# Patient Record
Sex: FEMALE | Race: WHITE | NOT HISPANIC OR LATINO | Employment: FULL TIME | URBAN - METROPOLITAN AREA
[De-identification: names, ages, dates, MRNs, and addresses within clinical notes are randomized per-mention and may not be internally consistent; named-entity substitution may affect disease eponyms.]

---

## 2017-02-07 ENCOUNTER — ALLSCRIPTS OFFICE VISIT (OUTPATIENT)
Dept: OTHER | Facility: OTHER | Age: 52
End: 2017-02-07

## 2017-02-07 DIAGNOSIS — M85.80 OTHER SPECIFIED DISORDERS OF BONE DENSITY AND STRUCTURE, UNSPECIFIED SITE: ICD-10-CM

## 2017-02-07 DIAGNOSIS — D64.9 ANEMIA: ICD-10-CM

## 2017-02-07 DIAGNOSIS — F32.9 MAJOR DEPRESSIVE DISORDER, SINGLE EPISODE: ICD-10-CM

## 2017-02-07 DIAGNOSIS — E04.2 NONTOXIC MULTINODULAR GOITER: ICD-10-CM

## 2017-02-07 DIAGNOSIS — Z12.31 ENCOUNTER FOR SCREENING MAMMOGRAM FOR MALIGNANT NEOPLASM OF BREAST: ICD-10-CM

## 2017-02-07 DIAGNOSIS — R87.612 LOW GRADE SQUAMOUS INTRAEPITHELIAL LESION ON CYTOLOGIC SMEAR OF CERVIX (LGSIL): ICD-10-CM

## 2017-05-31 ENCOUNTER — GENERIC CONVERSION - ENCOUNTER (OUTPATIENT)
Dept: OTHER | Facility: OTHER | Age: 52
End: 2017-05-31

## 2017-06-02 ENCOUNTER — ALLSCRIPTS OFFICE VISIT (OUTPATIENT)
Dept: OTHER | Facility: OTHER | Age: 52
End: 2017-06-02

## 2017-06-05 LAB
ADDITIONAL INFORMATION (HISTORICAL): NORMAL
DIAGNOSIS (HISTORICAL): NORMAL
PATHOLOGIST (HISTORICAL): NORMAL
PLEASE NOTE (HISTORICAL): NORMAL
PROCEDURE (HISTORICAL): NORMAL
SITE/SOURCE (HISTORICAL): NORMAL

## 2017-07-10 ENCOUNTER — ALLSCRIPTS OFFICE VISIT (OUTPATIENT)
Dept: OTHER | Facility: OTHER | Age: 52
End: 2017-07-10

## 2017-07-14 ENCOUNTER — GENERIC CONVERSION - ENCOUNTER (OUTPATIENT)
Dept: OTHER | Facility: OTHER | Age: 52
End: 2017-07-14

## 2017-07-25 ENCOUNTER — ANESTHESIA EVENT (OUTPATIENT)
Dept: PERIOP | Facility: HOSPITAL | Age: 52
End: 2017-07-25
Payer: COMMERCIAL

## 2017-07-26 ENCOUNTER — ANESTHESIA (OUTPATIENT)
Dept: PERIOP | Facility: HOSPITAL | Age: 52
End: 2017-07-26
Payer: COMMERCIAL

## 2017-07-26 ENCOUNTER — HOSPITAL ENCOUNTER (OUTPATIENT)
Facility: HOSPITAL | Age: 52
Setting detail: OUTPATIENT SURGERY
Discharge: HOME | End: 2017-07-26
Attending: OBSTETRICS & GYNECOLOGY | Admitting: OBSTETRICS & GYNECOLOGY
Payer: COMMERCIAL

## 2017-07-26 VITALS
TEMPERATURE: 97.6 F | HEART RATE: 80 BPM | RESPIRATION RATE: 18 BRPM | SYSTOLIC BLOOD PRESSURE: 108 MMHG | HEIGHT: 64 IN | DIASTOLIC BLOOD PRESSURE: 61 MMHG | BODY MASS INDEX: 21.68 KG/M2 | OXYGEN SATURATION: 99 % | WEIGHT: 127 LBS

## 2017-07-26 DIAGNOSIS — N84.0 POLYP OF CORPUS UTERI: ICD-10-CM

## 2017-07-26 LAB
EXT PREGNANCY TEST URINE: NEGATIVE
HCT VFR BLD AUTO: 40.5 % (ref 34.8–46.1)

## 2017-07-26 PROCEDURE — 85014 HEMATOCRIT: CPT | Performed by: OBSTETRICS & GYNECOLOGY

## 2017-07-26 PROCEDURE — 81025 URINE PREGNANCY TEST: CPT | Performed by: OBSTETRICS & GYNECOLOGY

## 2017-07-26 PROCEDURE — 88305 TISSUE EXAM BY PATHOLOGIST: CPT | Performed by: OBSTETRICS & GYNECOLOGY

## 2017-07-26 RX ORDER — MAGNESIUM HYDROXIDE 1200 MG/15ML
LIQUID ORAL AS NEEDED
Status: DISCONTINUED | OUTPATIENT
Start: 2017-07-26 | End: 2017-07-26 | Stop reason: HOSPADM

## 2017-07-26 RX ORDER — SODIUM CHLORIDE 9 MG/ML
INJECTION, SOLUTION INTRAVENOUS AS NEEDED
Status: DISCONTINUED | OUTPATIENT
Start: 2017-07-26 | End: 2017-07-26 | Stop reason: HOSPADM

## 2017-07-26 RX ORDER — PROPOFOL 10 MG/ML
INJECTION, EMULSION INTRAVENOUS AS NEEDED
Status: DISCONTINUED | OUTPATIENT
Start: 2017-07-26 | End: 2017-07-26 | Stop reason: SURG

## 2017-07-26 RX ORDER — FENTANYL CITRATE 50 UG/ML
INJECTION, SOLUTION INTRAMUSCULAR; INTRAVENOUS AS NEEDED
Status: DISCONTINUED | OUTPATIENT
Start: 2017-07-26 | End: 2017-07-26 | Stop reason: SURG

## 2017-07-26 RX ORDER — ONDANSETRON 2 MG/ML
4 INJECTION INTRAMUSCULAR; INTRAVENOUS EVERY 6 HOURS PRN
Status: DISCONTINUED | OUTPATIENT
Start: 2017-07-26 | End: 2017-07-26 | Stop reason: HOSPADM

## 2017-07-26 RX ORDER — LIDOCAINE HYDROCHLORIDE 10 MG/ML
INJECTION, SOLUTION INFILTRATION; PERINEURAL AS NEEDED
Status: DISCONTINUED | OUTPATIENT
Start: 2017-07-26 | End: 2017-07-26 | Stop reason: SURG

## 2017-07-26 RX ORDER — FENTANYL CITRATE 50 UG/ML
50 INJECTION, SOLUTION INTRAMUSCULAR; INTRAVENOUS
Status: DISCONTINUED | OUTPATIENT
Start: 2017-07-26 | End: 2017-07-26 | Stop reason: HOSPADM

## 2017-07-26 RX ORDER — IBUPROFEN 600 MG/1
600 TABLET ORAL EVERY 6 HOURS PRN
Status: DISCONTINUED | OUTPATIENT
Start: 2017-07-26 | End: 2017-07-26 | Stop reason: HOSPADM

## 2017-07-26 RX ORDER — MIDAZOLAM HYDROCHLORIDE 1 MG/ML
INJECTION INTRAMUSCULAR; INTRAVENOUS AS NEEDED
Status: DISCONTINUED | OUTPATIENT
Start: 2017-07-26 | End: 2017-07-26 | Stop reason: SURG

## 2017-07-26 RX ORDER — SODIUM CHLORIDE 9 MG/ML
100 INJECTION, SOLUTION INTRAVENOUS CONTINUOUS
Status: DISCONTINUED | OUTPATIENT
Start: 2017-07-26 | End: 2017-07-26 | Stop reason: HOSPADM

## 2017-07-26 RX ORDER — ONDANSETRON 2 MG/ML
INJECTION INTRAMUSCULAR; INTRAVENOUS AS NEEDED
Status: DISCONTINUED | OUTPATIENT
Start: 2017-07-26 | End: 2017-07-26 | Stop reason: SURG

## 2017-07-26 RX ORDER — EPHEDRINE SULFATE 50 MG/ML
INJECTION, SOLUTION INTRAVENOUS AS NEEDED
Status: DISCONTINUED | OUTPATIENT
Start: 2017-07-26 | End: 2017-07-26 | Stop reason: SURG

## 2017-07-26 RX ADMIN — ONDANSETRON HYDROCHLORIDE 4 MG: 2 INJECTION, SOLUTION INTRAVENOUS at 12:21

## 2017-07-26 RX ADMIN — SODIUM CHLORIDE 100 ML/HR: 0.9 INJECTION, SOLUTION INTRAVENOUS at 11:52

## 2017-07-26 RX ADMIN — EPHEDRINE SULFATE 10 MG: 50 INJECTION, SOLUTION INTRAMUSCULAR; INTRAVENOUS; SUBCUTANEOUS at 12:12

## 2017-07-26 RX ADMIN — MIDAZOLAM HYDROCHLORIDE 2 MG: 1 INJECTION, SOLUTION INTRAMUSCULAR; INTRAVENOUS at 12:03

## 2017-07-26 RX ADMIN — LIDOCAINE HYDROCHLORIDE 50 MG: 10 INJECTION, SOLUTION INFILTRATION; PERINEURAL at 12:05

## 2017-07-26 RX ADMIN — DEXAMETHASONE SODIUM PHOSPHATE 6 MG: 10 INJECTION INTRAMUSCULAR; INTRAVENOUS at 12:19

## 2017-07-26 RX ADMIN — IBUPROFEN 600 MG: 600 TABLET, FILM COATED ORAL at 13:39

## 2017-07-26 RX ADMIN — PROPOFOL 200 MG: 10 INJECTION, EMULSION INTRAVENOUS at 12:05

## 2017-07-26 RX ADMIN — FENTANYL CITRATE 100 MCG: 50 INJECTION, SOLUTION INTRAMUSCULAR; INTRAVENOUS at 12:05

## 2017-08-21 ENCOUNTER — GENERIC CONVERSION - ENCOUNTER (OUTPATIENT)
Dept: OTHER | Facility: OTHER | Age: 52
End: 2017-08-21

## 2018-01-02 ENCOUNTER — GENERIC CONVERSION - ENCOUNTER (OUTPATIENT)
Dept: OTHER | Facility: OTHER | Age: 53
End: 2018-01-02

## 2018-01-10 NOTE — MISCELLANEOUS
Message   Date: 14 Jul 2017 1:47 PM EST, Recorded By: Asa Mike For: Heide Boehringer: Meagan Villagran, Self   Phone: [phone] (Home)   Reason: Renew Medication   Patient called c/o menses started again yesterday, 2 weeks early  Had been taking Aygestin 5 mg but doesn't have any left   has surgery scheduled for 7/26/17  Per [facility], called pharm with Aygestin 5 mg refill, once daily if needed, #20  Advised patient only take if flow increases  Active Problems    1  Anemia (285 9) (D64 9)   2  Depression (311) (F32 9)   3  Encounter for screening mammogram for breast cancer (V76 12) (Z12 31)   4  Endometrial polyp (621 0) (N84 0)   5  Menorrhagia (626 2) (N92 0)   6  Multiple thyroid nodules (241 1) (E04 2)   7  Osteopenia (733 90) (M85 80)   8  Pap smear abnormality of cervix with LGSIL (795 03) (R87 612)   9  Simple endometrial hyperplasia without atypia (621 31) (N85 01)    Current Meds   1  Collagen CAPS Recorded   2  Iron Supplement 325 (65 Fe) MG TABS; Therapy: 62ATO3880 to Recorded   3  Multivitamins Oral Capsule; Therapy: 02EXB0796 to Recorded   4  Norethindrone Acetate 5 MG Oral Tablet; 1 tablet BID; Therapy: 71NJH5868 to (Last Rx:02Jun2017)  Requested for: 02Jun2017 Ordered   5  Omega-3-acid Ethyl Esters 1 GM Oral Capsule Recorded   6  Progesterone CREA; Therapy: (Recorded:07Apr2016) to Recorded    Allergies    1   No Known Drug Allergies    Signatures   Electronically signed by : William Birch, ; Jul 14 2017  1:49PM EST                       (Author)

## 2018-01-12 VITALS
DIASTOLIC BLOOD PRESSURE: 68 MMHG | HEIGHT: 64 IN | SYSTOLIC BLOOD PRESSURE: 100 MMHG | WEIGHT: 134 LBS | HEART RATE: 80 BPM | BODY MASS INDEX: 22.88 KG/M2

## 2018-01-13 NOTE — MISCELLANEOUS
Message   Recorded as Task   Date: 02/25/2016 08:16 AM, Created By: Emily Lemon   Task Name: Go to Result   Assigned To: Theo Benedict   Regarding Patient: Dallas Stoo, Status: Active   Comment:    Emily Lemon - 25 Feb 2016 8:16 AM     TASK CREATED  LGSIL pap, + HPV, neg chlamydia and gc, needs colposcopy        Active Problems    1  Anemia (285 9) (D64 9)   2  Depression (311) (F32 9)   3  Encounter for screening for malignant neoplasm of cervix (V76 2) (Z12 4)   4  Encounter for screening for malignant neoplasm of colon (V76 51) (Z12 11)   5  Encounter for screening mammogram for malignant neoplasm of breast (V76 12)   (Z12 31)   6  Insomnia (780 52) (G47 00)   7  Multiple thyroid nodules (241 1) (E04 2)   8  Need for immunization against influenza (V04 81) (Z23)   9  Osteopenia (733 90) (M85 80)   10  Pap smear, as part of routine gynecological examination (V76 2) (Z01 419)   11  Screening examination for STD (sexually transmitted disease) (V74 5) (Z11 3)   12  Screening for HPV (human papillomavirus) (V73 81) (Z11 51)   13  Screening for STDs (sexually transmitted diseases) (V74 5) (Z11 3)   14  Well woman exam with routine gynecological exam (V72 31) (Z01 419)    Current Meds   1  Effexor XR 75 MG Oral Capsule Extended Release 24 Hour (Venlafaxine HCl ER); take 1   capsule by mouth once daily; Therapy: 08EVN7799 to Recorded   2  Iron Supplement 325 (65 Fe) MG Oral Tablet; Therapy: 51LWO0455 to Recorded   3  Multivitamins Oral Capsule; Therapy: 81QQS5428 to Recorded    Allergies    1   No Known Drug Allergies    Signatures   Electronically signed by : Nuris Naqvi, ; Feb 25 2016 10:19AM EST                       (Author)

## 2018-01-14 VITALS
SYSTOLIC BLOOD PRESSURE: 104 MMHG | DIASTOLIC BLOOD PRESSURE: 66 MMHG | HEIGHT: 64 IN | BODY MASS INDEX: 22.88 KG/M2 | WEIGHT: 134 LBS

## 2018-01-14 VITALS
HEIGHT: 64 IN | DIASTOLIC BLOOD PRESSURE: 68 MMHG | BODY MASS INDEX: 22.92 KG/M2 | SYSTOLIC BLOOD PRESSURE: 118 MMHG | WEIGHT: 134.25 LBS

## 2018-01-16 NOTE — MISCELLANEOUS
Message   Date: 08 Jun 2016 1:24 PM EST, Recorded By: Reid Pulido For: Chele Mcqueen   Caller: Javier Hsu, Self   Phone: [phone] (Home)   Reason: Medical Complaint   pt had some heavy bleeding    when the call was returned, it had slowed down    advised pt she can take 600 mg of Ibuprofen    pt will call if this continues           Active Problems    1  Abnormal cervical Pap smear with positive HPV DNA test (795 09,079 4)   2  Anemia (285 9) (D64 9)   3  Depression (311) (F32 9)   4  Encounter for screening for malignant neoplasm of cervix (V76 2) (Z12 4)   5  Encounter for screening for malignant neoplasm of colon (V76 51) (Z12 11)   6  Encounter for screening mammogram for malignant neoplasm of breast (V76 12)   (Z12 31)   7  Multiple thyroid nodules (241 1) (E04 2)   8  Need for immunization against influenza (V04 81) (Z23)   9  Osteopenia (733 90) (M85 80)   10  Pap smear abnormality of cervix with LGSIL (795 03) (R87 612)   11  Pap smear, as part of routine gynecological examination (V76 2) (Z01 419)   12  Screening examination for STD (sexually transmitted disease) (V74 5) (Z11 3)   13  Screening for HPV (human papillomavirus) (V73 81) (Z11 51)   14  Screening for STDs (sexually transmitted diseases) (V74 5) (Z11 3)   15  Well woman exam with routine gynecological exam (V72 31) (Z01 419)    Current Meds   1  Collagen CAPS Recorded   2  Effexor XR 75 MG Oral Capsule Extended Release 24 Hour (Venlafaxine HCl ER); take 1   capsule by mouth once daily; Therapy: 24PMN6947 to Recorded   3  Iron Supplement 325 (65 Fe) MG Oral Tablet; Therapy: 98VUD9997 to Recorded   4  Multivitamins Oral Capsule; Therapy: 51JXT0985 to Recorded   5  Omega-3-acid Ethyl Esters 1 GM Oral Capsule Recorded   6  Progesterone CREA; Therapy: (Recorded:13Xdt1504) to Recorded    Allergies    1   No Known Drug Allergies    Signatures   Electronically signed by : Patito Solis, ; Jun 8 2016  1:26PM EST (Author)

## 2018-01-16 NOTE — MISCELLANEOUS
Message   Recorded as Task   Date: 04/09/2016 10:27 AM, Created By: Wilkins Shone   Task Name: Go to Result   Assigned To: [facility]   Regarding Patient: Sarahi Holbrook, Status: In Progress   Comment:    Wilkins Shone - 09 Apr 2016 10:27 AM     TASK CREATED  colpo shows mild dysplasia, pap next year with HPV   Ruby Powell - 12 Apr 2016 8:17 AM     TASK IN PROGRESS    Patient informed  Active Problems    1  Abnormal cervical Pap smear with positive HPV DNA test (795 09,079 4)   2  Anemia (285 9) (D64 9)   3  Depression (311) (F32 9)   4  Encounter for screening for malignant neoplasm of cervix (V76 2) (Z12 4)   5  Encounter for screening for malignant neoplasm of colon (V76 51) (Z12 11)   6  Encounter for screening mammogram for malignant neoplasm of breast (V76 12)   (Z12 31)   7  Multiple thyroid nodules (241 1) (E04 2)   8  Need for immunization against influenza (V04 81) (Z23)   9  Osteopenia (733 90) (M85 80)   10  Pap smear abnormality of cervix with LGSIL (795 03) (R87 612)   11  Pap smear, as part of routine gynecological examination (V76 2) (Z01 419)   12  Screening examination for STD (sexually transmitted disease) (V74 5) (Z11 3)   13  Screening for HPV (human papillomavirus) (V73 81) (Z11 51)   14  Screening for STDs (sexually transmitted diseases) (V74 5) (Z11 3)   15  Well woman exam with routine gynecological exam (V72 31) (Z01 419)    Current Meds   1  Collagen CAPS Recorded   2  Effexor XR 75 MG Oral Capsule Extended Release 24 Hour (Venlafaxine HCl ER); take 1   capsule by mouth once daily; Therapy: 07EHS3965 to Recorded   3  Iron Supplement 325 (65 Fe) MG Oral Tablet; Therapy: 73LDE7094 to Recorded   4  Multivitamins Oral Capsule; Therapy: 26YUC3887 to Recorded   5  Omega-3-acid Ethyl Esters 1 GM Oral Capsule Recorded   6  Progesterone CREA; Therapy: (Recorded:57Btg6782) to Recorded    Allergies    1   No Known Drug Allergies    Signatures   Electronically signed by : Neil Conroy, ; Apr 12 2016  8:23AM EST                       (Author)

## 2018-01-17 NOTE — RESULT NOTES
Message   Recorded as Task   Date: 02/11/2016 07:09 PM, Created By: Faina Kaur   Task Name: Follow Up   Assigned To: Sylvia Davis   Regarding Patient: Mer Duval, Status: Active   Comment:    Raven Alfaro - 11 Feb 2016 7:09 PM     TASK CREATED  Please nofity patient that her thyroid U/S is stable  No further intervention is necessary at this time  Continue routine (annual) surveillance  Thank you,    Emily Lane - 15 Feb 2016 12:52 PM     TASK EDITED  [facility] informing pt of results and instructions - pt has follow up appt scheduled next month       Signatures   Electronically signed by : Radha Contreras, ; Feb 15 2016 12:52PM EST                       (Author)

## 2018-01-17 NOTE — RESULT NOTES
Verified Results  * MAMMO SCREENING BILATERAL W CAD [id number] 02:58PM Eugene Pay     Test Name Result Flag Reference   MAMMO SCREENING BILATERAL W CAD (Report)     Patient History:   Family history of unknown cancer in paternal grandmother at age    48 or over, unknown cancer in mother at age 48 or over, unknown    cancer in maternal aunt at age 48 or over, and unknown cancer in    maternal grandmother at age 48 or over  Patient is a former smoker  Patient's BMI is 23 0  Prior films   arrived on 3/3/16     Reason for exam: screening (asymptomatic)  Mammo Screening Bilateral W CAD: February 29, 2016 - Check In #:    [de-identified]   Bilateral CC and MLO view(s) were taken  Technologist: ANDREW Zamarripa (R)(M)   Prior study comparison: February 25, 2009, mammo screening    bilateral W CAD, performed at [facility]  November 6, 2007, mammo screening bilateral W CAD, performed at [facility]  The breast tissue is heterogeneously dense, potentially limiting    the sensitivity of mammography  Patient risk, included in this    report, assists in determining the appropriate screening regimen    (such as 3-D mammography or the inclusion of automated breast    ultrasound or MRI)  3-D mammography may also remain indicated as    screening  The parenchymal pattern appears stable  No dominant soft tissue    mass or suspicious calcifications are noted  The skin and nipple   contours are within normal limits  No mammographic evidence of malignancy  No    significant changes when compared with prior studies  ASSESSMENT: BiRad:1 - Negative     Recommendation:   Routine screening mammogram in 1 year  A reminder letter will be   scheduled  Analyzed by CAD     8-10% of cancers will be missed on mammography  Management of a    palpable abnormality must be based on clinical grounds  Patients   will be notified of their results via letter from our facility     Accredited by [employer] and FDA  Transcription Location: ANDREW [address] 98: [id number]     Risk Value(s):   Tyrer-Cuzick 10 Year: 2 440%, Tyrer-Cuzick Lifetime: 9 535%,    Myriad Table: 1 5%, GERARDO 5 Year: 1 2%, NCI Lifetime: 9 6%   Signed by:   Margarette Watts MD   3/10/16       Discussion/Summary   PLease notify of normal mammo  Repeat in 1 year     CB

## 2018-01-17 NOTE — RESULT NOTES
Message   Recorded as Task   Date: 03/04/2016 04:07 PM, Created By: Addie Porter   Task Name: Follow Up   Assigned To: Sylvia Davis   Regarding Patient: Neela Peraza, Status: Active   CommentRomell Jaime - 04 Mar 2016 4:07 PM     TASK CREATED  Please call patient  Her labs are great  She may have a copy if she would like  Her Vitamin D is slightly low, so I would double the dosage she is currently taking  Thyroid is normal     Thank you,    CB   Sylvia Davis - 07 Mar 2016 1:45 PM     TASK EDITED  pt notified of results and instructions    copy of labs mailed to pt     Signatures   Electronically signed by : Josie Echevarria, ; Mar  7 2016  1:45PM EST                       (Author)

## 2018-01-17 NOTE — PROCEDURES
Assessment    1  Abnormal cervical Pap smear with positive HPV DNA test (795 09,079 4)   2  Pap smear abnormality of cervix with LGSIL (795 03) (R87 612)    Plan  Abnormal cervical Pap smear with positive HPV DNA test, Pap smear abnormality of  cervix with LGSIL    · (Q) TISSUE PATHOLOGY; Status:Complete;   Done: 28UYB7634   Perform:[facility]; Order Comments:SPECIMEN: ECC SOURCE: ENDOCERVIX SPECIMEN: CERVICAL BIOPSY SOURCE: CERVIX @ 9:00; UKL:[id number]; Last Updated By:Elle Contreras; 4/7/2016 12:16:22 PM;Ordered; For:Abnormal cervical Pap smear with positive HPV DNA test, Pap smear abnormality of cervix with LGSIL; Ordered By:Laila Zarate;    Discussion/Summary  Discussion Summary:   LGSIL, + HPV - reviewed this with pt, will await colposcopy results    Her US today was normal with a small fibroid which is what I most likely palpated on exam       Active Problems    1  Anemia (285 9) (D64 9)   2  Depression (311) (F32 9)   3  Encounter for screening for malignant neoplasm of cervix (V76 2) (Z12 4)   4  Encounter for screening for malignant neoplasm of colon (V76 51) (Z12 11)   5  Encounter for screening mammogram for malignant neoplasm of breast (V76 12)   (Z12 31)   6  Multiple thyroid nodules (241 1) (E04 2)   7  Need for immunization against influenza (V04 81) (Z23)   8  Osteopenia (733 90) (M85 80)   9  Pap smear, as part of routine gynecological examination (V76 2) (Z01 419)   10  Screening examination for STD (sexually transmitted disease) (V74 5) (Z11 3)   11  Screening for HPV (human papillomavirus) (V73 81) (Z11 51)   12  Screening for STDs (sexually transmitted diseases) (V74 5) (Z11 3)   13  Well woman exam with routine gynecological exam (V72 31) (Z01 419)    Current Meds    1  Effexor XR 75 MG Oral Capsule Extended Release 24 Hour; take 1 capsule by mouth   once daily; Therapy: 04KQY1713 to Recorded    2  Collagen CAPS Recorded   3  Omega-3-acid Ethyl Esters 1 GM Oral Capsule Recorded    4   Iron Supplement 325 (65 Fe) MG Oral Tablet; Therapy: 56ZVG8245 to Recorded   5  Multivitamins Oral Capsule; Therapy: 25VXZ8230 to Recorded   6  Progesterone CREA; Therapy: (Recorded:07Apr2016) to Recorded    Allergies    1  No Known Drug Allergies    Procedure    Procedure: colposcopy  Indication: low grade squamous intraepithelial lesion  Were discussed with the patient  Procedure Note:   A cervical Pap smear was not performed  The squamocolumnar junction was fully visualized  Observation without staining showed no abnormalities  After bathing the cervix in acetic acid, evaluation showed acetowhite changes at 9 o'clock and nabothian at 12  Vaginal Vault: no abnormalities seen  Vulva: no abnormalities seen  Cervical Biopsy: 1 biopsies taken of the cervix  the biopsies were taken at 9 o'clock  Endocervical curettage was performed  Hemostasis was obtained with Monsel's solution  Patient Status: the patient tolerated the procedure well  Complications: there were no complications  Future Appointments    Date/Time Provider Specialty Site   06/08/2016 10:00 AM CHARLIE Aiken  [address] [facility]     Signatures   Electronically signed by : Vladislav Hidalgo DO;  Apr 7 2016 12:55PM EST                       (Author)

## 2018-01-19 ENCOUNTER — GENERIC CONVERSION - ENCOUNTER (OUTPATIENT)
Dept: OTHER | Facility: OTHER | Age: 53
End: 2018-01-19

## 2018-01-22 VITALS
WEIGHT: 129 LBS | DIASTOLIC BLOOD PRESSURE: 72 MMHG | BODY MASS INDEX: 22.02 KG/M2 | HEIGHT: 64 IN | SYSTOLIC BLOOD PRESSURE: 102 MMHG

## 2018-01-23 NOTE — MISCELLANEOUS
Message   Recorded as Task   Date: 01/19/2018 11:35 AM, Created By: Clarence Montilla   Task Name: Medical Complaint Callback   Assigned To: Ruby Powell   Regarding Patient: Kenia Franklin, Status: Active   CommentLoa Shows - 19 Jan 2018 11:35 AM     TASK CREATED  Patient called c/o bleeding again , heavy flow, finished Aygestin  She is coming in for appointment in February to schedule surgery  Questions if she needs refill of Aygestin to last until then  Bull Collazo - 19 Jan 2018 11:45 AM     TASK REPLIED TO: Previously Assigned To Bull Collazo  I wouldTeller to stayOn Aygestin at thisTime till I see her forSurgery scheduling  Called pharm with Rx refill  Active Problems    1  Anemia (285 9) (D64 9)   2  Depression (311) (F32 9)   3  Encounter for screening mammogram for breast cancer (V76 12) (Z12 31)   4  Endometrial polyp (621 0) (N84 0)   5  Menorrhagia (626 2) (N92 0)   6  Multiple thyroid nodules (241 1) (E04 2)   7  Osteopenia (733 90) (M85 80)   8  Pap smear abnormality of cervix with LGSIL (795 03) (R87 612)   9  Simple endometrial hyperplasia without atypia (621 31) (N85 01)    Current Meds   1  Iron Supplement 325 (65 Fe) MG TABS; Therapy: 29AHY2362 to Recorded   2  Multivitamins Oral Capsule; Therapy: 91AZI5248 to Recorded   3  Norethindrone Acetate 5 MG Oral Tablet; TAKE AS DIRECTED; Therapy: 72CPH0223 to (PJRWNRWX:20KCY0243)  Requested for: 12Jan2018; Last   Rx:12Jan2018 Ordered    Allergies    1   No Known Drug Allergies    Plan  Menorrhagia    · Norethindrone Acetate 5 MG Oral Tablet (Aygestin); TAKE AS DIRECTED    Signatures   Electronically signed by : Yunior Cabrera, ; Jan 19 2018  1:35PM EST                       (Author)

## 2018-01-23 NOTE — MISCELLANEOUS
Message   Recorded as Task   Date: 01/02/2018 11:20 AM, Created By: Serina Jain   Task Name: Medical Complaint Callback   Assigned To: Rbuy Powell   Regarding Patient: Hari Pereyra, Status: Active   CommentTashley Barrera - 02 Jan 2018 11:20 AM     TASK CREATED  Patient called c/o heavy menses, day 2, 7 tampons so far today, bright red bleeding  Questions if needs Rx for Aygestin as used before  Had D&C on 7/26/17  Skipped menses until November  Now heavy menses  Bull Collazo - 02 Jan 2018 11:28 AM     TASK REPLIED TO: Previously Assigned To Bull Collazo  Give her 5 tablets of Aygestin, 5 mg each, to start now and to take 1 daily till complete  Escript sent  Active Problems    1  Anemia (285 9) (D64 9)   2  Depression (311) (F32 9)   3  Encounter for screening mammogram for breast cancer (V76 12) (Z12 31)   4  Endometrial polyp (621 0) (N84 0)   5  Menorrhagia (626 2) (N92 0)   6  Multiple thyroid nodules (241 1) (E04 2)   7  Osteopenia (733 90) (M85 80)   8  Pap smear abnormality of cervix with LGSIL (795 03) (R87 612)   9  Simple endometrial hyperplasia without atypia (621 31) (N85 01)    Current Meds   1  Iron Supplement 325 (65 Fe) MG TABS; Therapy: 75JUG7882 to Recorded   2  Multivitamins Oral Capsule; Therapy: 19BEU6056 to Recorded    Allergies    1   No Known Drug Allergies    Plan  Menorrhagia    · Norethindrone Acetate 5 MG Oral Tablet (Aygestin); TAKE AS DIRECTED    Signatures   Electronically signed by : Humberto Monteiro, ; Jan 2 2018 12:23PM EST                       (Author)

## 2018-02-22 ENCOUNTER — CONSULT (OUTPATIENT)
Dept: OBGYN CLINIC | Facility: CLINIC | Age: 53
End: 2018-02-22
Payer: COMMERCIAL

## 2018-02-22 VITALS
HEIGHT: 63 IN | SYSTOLIC BLOOD PRESSURE: 100 MMHG | BODY MASS INDEX: 23.42 KG/M2 | WEIGHT: 132.2 LBS | DIASTOLIC BLOOD PRESSURE: 60 MMHG

## 2018-02-22 DIAGNOSIS — N87.0 CIN I (CERVICAL INTRAEPITHELIAL NEOPLASIA I): Primary | ICD-10-CM

## 2018-02-22 DIAGNOSIS — N92.6 IRREGULAR MENSES: ICD-10-CM

## 2018-02-22 DIAGNOSIS — Z12.31 ENCOUNTER FOR SCREENING MAMMOGRAM FOR BREAST CANCER: ICD-10-CM

## 2018-02-22 PROCEDURE — 99213 OFFICE O/P EST LOW 20 MIN: CPT | Performed by: OBSTETRICS & GYNECOLOGY

## 2018-02-22 NOTE — PROGRESS NOTES
Assessment/Plan:       Simple hyperplasia without atypia, resolved-I reviewed this with her in detail including pictures  She had 1 prolonged, heavy cycle but she has also been skipping cycles  I would recommend observation at this time  She will keep menstrual calendar and call if she has any further prolonged or heavy cycles  She does have another refill of Aygestin at home and this could be used if she has another abnormal cycle, but she will call  JW 1- Pap done, if this is normal it will be repeated next year    Encouraged her to come in for her yearly  Diagnoses and all orders for this visit:    JW I (cervical intraepithelial neoplasia I)  -     Thinprep Pap    Encounter for screening mammogram for breast cancer    Other orders  -     norethindrone (AYGESTIN) 5 mg tablet; Take by mouth          Subjective:     Patient ID: Ivett Portillo is a 45 y o  female  Patient here to discuss her menstrual cycles  Last summer she was having irregular cycles  She came in and had SIS and an endometrial biopsy  She had simple hyperplasia without atypia and an endometrial polyp  She was treated with progesterone in then had a D&C  Pathology revealed normal endometrium with no hyperplasia and a benign endometrial polyp  She is been skipping menstrual cycles but in December she had a very heavy cycle that lasted about 3 weeks  She did not call  She started taking Aygestin that she had at home and then stopped it and is now having a withdrawal bleed this week  It is not heavy  OF note, she had an abnormal pap in 2016 and JW I  She has not had a repap yet  Review of Systems   All other systems reviewed and are negative  Objective:     Physical Exam   Genitourinary: Vagina normal and uterus normal  Right adnexum displays no mass, no tenderness and no fullness  Left adnexum displays no mass, no tenderness and no fullness

## 2018-02-27 LAB
QUESTION/PROBLEM: NORMAL
SL AMB CONTAINER TYPE: NORMAL
SL AMB FINAL RESOLUTION: NORMAL
SL AMB REPORT STATUS: NORMAL

## 2018-04-10 ENCOUNTER — TELEPHONE (OUTPATIENT)
Dept: OBGYN CLINIC | Facility: CLINIC | Age: 53
End: 2018-04-10

## 2018-04-10 NOTE — TELEPHONE ENCOUNTER
----- Message from Lorraine Barnard DO sent at 4/10/2018  1:11 PM EDT -----  Initially this pap was labeled incorrectly, we fixed this and the lab was supposed to run it, I still havent received a result  Can you check on it and if it was not done, please call pt and ask her to come in for a repeat, free of charge  Please let me know the outcome  Thanks

## 2019-02-06 ENCOUNTER — OFFICE VISIT (OUTPATIENT)
Dept: FAMILY MEDICINE CLINIC | Facility: CLINIC | Age: 54
End: 2019-02-06
Payer: COMMERCIAL

## 2019-02-06 VITALS
HEART RATE: 72 BPM | DIASTOLIC BLOOD PRESSURE: 58 MMHG | WEIGHT: 125.8 LBS | HEIGHT: 63 IN | BODY MASS INDEX: 22.29 KG/M2 | SYSTOLIC BLOOD PRESSURE: 112 MMHG

## 2019-02-06 DIAGNOSIS — Z12.39 BREAST CANCER SCREENING: ICD-10-CM

## 2019-02-06 DIAGNOSIS — D50.0 IRON DEFICIENCY ANEMIA DUE TO CHRONIC BLOOD LOSS: ICD-10-CM

## 2019-02-06 DIAGNOSIS — R07.89 ATYPICAL CHEST PAIN: ICD-10-CM

## 2019-02-06 DIAGNOSIS — E04.2 MULTIPLE THYROID NODULES: ICD-10-CM

## 2019-02-06 DIAGNOSIS — E55.9 VITAMIN D DEFICIENCY: ICD-10-CM

## 2019-02-06 DIAGNOSIS — M85.89 OSTEOPENIA OF MULTIPLE SITES: ICD-10-CM

## 2019-02-06 DIAGNOSIS — F41.9 ANXIETY: ICD-10-CM

## 2019-02-06 DIAGNOSIS — R92.2 DENSE BREAST: ICD-10-CM

## 2019-02-06 DIAGNOSIS — Z13.220 LIPID SCREENING: ICD-10-CM

## 2019-02-06 DIAGNOSIS — Z00.00 ENCOUNTER FOR PHYSICAL EXAMINATION: Primary | ICD-10-CM

## 2019-02-06 PROBLEM — R92.30 DENSE BREAST: Status: ACTIVE | Noted: 2019-02-06

## 2019-02-06 PROBLEM — N84.0 ENDOMETRIAL POLYP: Status: ACTIVE | Noted: 2017-07-10

## 2019-02-06 PROBLEM — N85.01 SIMPLE ENDOMETRIAL HYPERPLASIA WITHOUT ATYPIA: Status: ACTIVE | Noted: 2017-07-10

## 2019-02-06 PROCEDURE — 99396 PREV VISIT EST AGE 40-64: CPT | Performed by: PHYSICIAN ASSISTANT

## 2019-02-06 NOTE — ASSESSMENT & PLAN NOTE
Exam WNL  Through discussion it appears that her symptoms are all related to stress and anxiety  EKG deferred by pt today  Check labs  BP good

## 2019-02-06 NOTE — PATIENT INSTRUCTIONS
Problem List Items Addressed This Visit        Endocrine    Multiple thyroid nodules     Last thyroid U/S was 3 days ago  U/S ordered  Relevant Orders    US thyroid    TSH, 3rd generation with Free T4 reflex       Musculoskeletal and Integument    Osteopenia of multiple sites     DEXA ordered as last was 2016  Relevant Orders    DXA bone density spine hip and pelvis       Other    Iron deficiency anemia due to chronic blood loss     Check CBC and iron panel  Relevant Orders    CBC and differential    Dense breast    Breast cancer screening     3D mammo ordered  Relevant Orders    Mammo screening bilateral w 3d & cad    Anxiety     Pt declines need for medication at this time  Encounter for physical examination - Primary     Check fasting labs, mammo, DEXA  See GYN  All else up to date  Atypical chest pain     Exam WNL  Through discussion it appears that her symptoms are all related to stress and anxiety  EKG deferred by pt today  Check labs  BP good            Relevant Orders    TSH, 3rd generation with Free T4 reflex    Comprehensive metabolic panel    CBC and differential      Other Visit Diagnoses     Lipid screening        Relevant Orders    Lipid panel    Vitamin D deficiency        Relevant Orders    Vitamin D 25 hydroxy    Comprehensive metabolic panel

## 2019-02-06 NOTE — PROGRESS NOTES
Assessment/Plan:    Osteopenia of multiple sites  DEXA ordered as last was 2016  Iron deficiency anemia due to chronic blood loss  Check CBC and iron panel  Breast cancer screening  3D mammo ordered  Multiple thyroid nodules  Last thyroid U/S was 3 days ago  U/S ordered  Anxiety  Pt declines need for medication at this time  Atypical chest pain  Exam WNL  Through discussion it appears that her symptoms are all related to stress and anxiety  EKG deferred by pt today  Check labs  BP good  Encounter for physical examination  Check fasting labs, mammo, DEXA  See GYN  All else up to date  Diagnoses and all orders for this visit:    Encounter for physical examination    Osteopenia of multiple sites  -     DXA bone density spine hip and pelvis; Future    Dense breast    Breast cancer screening  -     Mammo screening bilateral w 3d & cad; Future    Multiple thyroid nodules  -     US thyroid; Future  -     TSH, 3rd generation with Free T4 reflex    Anxiety    Iron deficiency anemia due to chronic blood loss  -     CBC and differential    Atypical chest pain  -     TSH, 3rd generation with Free T4 reflex  -     Comprehensive metabolic panel  -     CBC and differential    Lipid screening  -     Lipid panel    Vitamin D deficiency  -     Vitamin D 25 hydroxy  -     Comprehensive metabolic panel          Subjective:   CC: Follow up for routine check  C/o pain that radiates down right arm  Patient ID: Stephanie Camacho is a 52 y o  female  Patient here for a "routine physical" however she does have a complaint of right arm pain associated with initial center chest pain that only is so stated with anxiety and stress per patient and not physical exertion  Pt not taking daily iron only sometimes but just ran out  CP in her chest lower right side over the holidays which radiates up and over R arm when she feels stressed  Hand gets cold  R handed   Can go to the gym and do a work out and not have chest pain it only comes when she is tense and muscles are stressed  Can happed in the middle of the night when she is thinking and cant sleep when brain can not stop going  Very concerned about the cost of [facility] as she has to pay $6,000 a year just to have health care and then her deductible is high  Ex  and money stressors are high  She is overdue for a mammo and Gyn visit  Did a dental visit this year  Eye visit done within 6 months  Patient is worried that this visit will not be coded and paid for properly if I do not market as a physical however patient has multiple ongoing chronic issues that need to be looked into  She is declining EKG today  Patient states that I can order anything that is appropriate but that she likely will not go ahead and complete any of her imaging or blood work due to cost   She states she is mainly here today to discuss her chest pain and right arm pain as above  The following portions of the patient's history were reviewed and updated as appropriate: allergies, current medications, past family history, past medical history, past social history, past surgical history and problem list     Review of Systems   Constitutional: Negative  HENT: Negative  Eyes: Negative  Respiratory: Negative  Cardiovascular: Negative  Gastrointestinal: Negative  Endocrine: Negative  Genitourinary: Negative  Musculoskeletal: Negative  R arm pain   Skin: Negative  Allergic/Immunologic: Negative  Neurological: Negative  Hematological: Negative  Psychiatric/Behavioral: Negative  Objective:      Vitals:    02/06/19 0928   BP: 112/58   BP Location: Left arm   Patient Position: Sitting   Pulse: 72   Weight: 57 1 kg (125 lb 12 8 oz)   Height: 5' 3" (1 6 m)          Physical Exam   Constitutional: She is oriented to person, place, and time  She appears well-developed and well-nourished  No distress     HENT:   Head: Normocephalic and atraumatic  Right Ear: Hearing, tympanic membrane, external ear and ear canal normal    Left Ear: Hearing, tympanic membrane, external ear and ear canal normal    Nose: Nose normal    Mouth/Throat: Uvula is midline, oropharynx is clear and moist and mucous membranes are normal  No oropharyngeal exudate  Eyes: Pupils are equal, round, and reactive to light  Conjunctivae, EOM and lids are normal  No scleral icterus  Neck: Normal range of motion  Carotid bruit is not present  No thyroid mass and no thyromegaly present  Cardiovascular: Regular rhythm, normal heart sounds and normal pulses  Exam reveals no gallop and no friction rub  No murmur heard  Pulmonary/Chest: Effort normal and breath sounds normal  No respiratory distress  She has no wheezes  She has no rhonchi  She has no rales  Abdominal: Soft  Normal appearance and bowel sounds are normal  She exhibits no distension, no abdominal bruit and no mass  There is no hepatosplenomegaly  There is no tenderness  There is no rebound and no guarding  No hernia  Musculoskeletal: Normal range of motion  Lymphadenopathy:     She has no cervical adenopathy  Neurological: She is alert and oriented to person, place, and time  She has normal strength and normal reflexes  She is not disoriented  No sensory deficit  She exhibits normal muscle tone  Coordination and gait normal    Skin: Skin is warm, dry and intact  She is not diaphoretic  Psychiatric: She has a normal mood and affect  Her speech is normal and behavior is normal  Judgment and thought content normal  Cognition and memory are normal    Nursing note and vitals reviewed

## 2020-01-24 ENCOUNTER — APPOINTMENT (OUTPATIENT)
Dept: LAB | Facility: CLINIC | Age: 55
End: 2020-01-24
Payer: COMMERCIAL

## 2020-01-24 LAB
ALBUMIN SERPL BCP-MCNC: 3.8 G/DL (ref 3.5–5)
ALP SERPL-CCNC: 45 U/L (ref 46–116)
ALT SERPL W P-5'-P-CCNC: 19 U/L (ref 12–78)
ANION GAP SERPL CALCULATED.3IONS-SCNC: 3 MMOL/L (ref 4–13)
AST SERPL W P-5'-P-CCNC: 10 U/L (ref 5–45)
BASOPHILS # BLD AUTO: 0.02 THOUSANDS/ΜL (ref 0–0.1)
BASOPHILS NFR BLD AUTO: 0 % (ref 0–1)
BILIRUB SERPL-MCNC: 0.88 MG/DL (ref 0.2–1)
BUN SERPL-MCNC: 14 MG/DL (ref 5–25)
CALCIUM SERPL-MCNC: 9.2 MG/DL (ref 8.3–10.1)
CHLORIDE SERPL-SCNC: 107 MMOL/L (ref 100–108)
CHOLEST SERPL-MCNC: 198 MG/DL (ref 50–200)
CO2 SERPL-SCNC: 29 MMOL/L (ref 21–32)
CREAT SERPL-MCNC: 0.76 MG/DL (ref 0.6–1.3)
EOSINOPHIL # BLD AUTO: 0.23 THOUSAND/ΜL (ref 0–0.61)
EOSINOPHIL NFR BLD AUTO: 5 % (ref 0–6)
ERYTHROCYTE [DISTWIDTH] IN BLOOD BY AUTOMATED COUNT: 13.4 % (ref 11.6–15.1)
GFR SERPL CREATININE-BSD FRML MDRD: 88 ML/MIN/1.73SQ M
GLUCOSE P FAST SERPL-MCNC: 75 MG/DL (ref 65–99)
HCT VFR BLD AUTO: 41.1 % (ref 34.8–46.1)
HDLC SERPL-MCNC: 92 MG/DL
HGB BLD-MCNC: 12.7 G/DL (ref 11.5–15.4)
IMM GRANULOCYTES # BLD AUTO: 0.01 THOUSAND/UL (ref 0–0.2)
IMM GRANULOCYTES NFR BLD AUTO: 0 % (ref 0–2)
LDLC SERPL CALC-MCNC: 100 MG/DL (ref 0–100)
LYMPHOCYTES # BLD AUTO: 1.7 THOUSANDS/ΜL (ref 0.6–4.47)
LYMPHOCYTES NFR BLD AUTO: 37 % (ref 14–44)
MCH RBC QN AUTO: 30.1 PG (ref 26.8–34.3)
MCHC RBC AUTO-ENTMCNC: 30.9 G/DL (ref 31.4–37.4)
MCV RBC AUTO: 97 FL (ref 82–98)
MONOCYTES # BLD AUTO: 0.51 THOUSAND/ΜL (ref 0.17–1.22)
MONOCYTES NFR BLD AUTO: 11 % (ref 4–12)
NEUTROPHILS # BLD AUTO: 2.17 THOUSANDS/ΜL (ref 1.85–7.62)
NEUTS SEG NFR BLD AUTO: 47 % (ref 43–75)
NONHDLC SERPL-MCNC: 106 MG/DL
NRBC BLD AUTO-RTO: 0 /100 WBCS
PLATELET # BLD AUTO: 252 THOUSANDS/UL (ref 149–390)
PMV BLD AUTO: 10.7 FL (ref 8.9–12.7)
POTASSIUM SERPL-SCNC: 4.4 MMOL/L (ref 3.5–5.3)
PROT SERPL-MCNC: 7.7 G/DL (ref 6.4–8.2)
RBC # BLD AUTO: 4.22 MILLION/UL (ref 3.81–5.12)
SODIUM SERPL-SCNC: 139 MMOL/L (ref 136–145)
TRIGL SERPL-MCNC: 32 MG/DL
TSH SERPL DL<=0.05 MIU/L-ACNC: 2.3 UIU/ML (ref 0.36–3.74)
WBC # BLD AUTO: 4.64 THOUSAND/UL (ref 4.31–10.16)

## 2020-01-24 PROCEDURE — 36415 COLL VENOUS BLD VENIPUNCTURE: CPT | Performed by: PHYSICIAN ASSISTANT

## 2020-01-24 PROCEDURE — 80061 LIPID PANEL: CPT | Performed by: PHYSICIAN ASSISTANT

## 2020-01-24 PROCEDURE — 85025 COMPLETE CBC W/AUTO DIFF WBC: CPT | Performed by: PHYSICIAN ASSISTANT

## 2020-01-24 PROCEDURE — 80053 COMPREHEN METABOLIC PANEL: CPT | Performed by: PHYSICIAN ASSISTANT

## 2020-01-24 PROCEDURE — 84443 ASSAY THYROID STIM HORMONE: CPT | Performed by: PHYSICIAN ASSISTANT

## 2020-01-29 ENCOUNTER — OFFICE VISIT (OUTPATIENT)
Dept: FAMILY MEDICINE CLINIC | Facility: CLINIC | Age: 55
End: 2020-01-29
Payer: COMMERCIAL

## 2020-01-29 VITALS
HEIGHT: 63 IN | BODY MASS INDEX: 22.68 KG/M2 | HEART RATE: 96 BPM | SYSTOLIC BLOOD PRESSURE: 98 MMHG | WEIGHT: 128 LBS | TEMPERATURE: 99.1 F | DIASTOLIC BLOOD PRESSURE: 62 MMHG | RESPIRATION RATE: 18 BRPM

## 2020-01-29 DIAGNOSIS — Z00.00 HEALTHCARE MAINTENANCE: ICD-10-CM

## 2020-01-29 DIAGNOSIS — D50.0 IRON DEFICIENCY ANEMIA DUE TO CHRONIC BLOOD LOSS: ICD-10-CM

## 2020-01-29 DIAGNOSIS — R07.9 CHEST PAIN, UNSPECIFIED TYPE: ICD-10-CM

## 2020-01-29 DIAGNOSIS — Z11.59 NEED FOR HEPATITIS C SCREENING TEST: ICD-10-CM

## 2020-01-29 DIAGNOSIS — E04.2 MULTIPLE THYROID NODULES: ICD-10-CM

## 2020-01-29 DIAGNOSIS — Z12.39 BREAST CANCER SCREENING: ICD-10-CM

## 2020-01-29 DIAGNOSIS — M85.89 OSTEOPENIA OF MULTIPLE SITES: ICD-10-CM

## 2020-01-29 DIAGNOSIS — R07.89 CHEST TIGHTNESS: Primary | ICD-10-CM

## 2020-01-29 DIAGNOSIS — F41.9 ANXIETY: ICD-10-CM

## 2020-01-29 DIAGNOSIS — Z78.0 MENOPAUSE: ICD-10-CM

## 2020-01-29 PROCEDURE — 3008F BODY MASS INDEX DOCD: CPT | Performed by: PHYSICIAN ASSISTANT

## 2020-01-29 PROCEDURE — 99213 OFFICE O/P EST LOW 20 MIN: CPT | Performed by: PHYSICIAN ASSISTANT

## 2020-01-29 PROCEDURE — 93000 ELECTROCARDIOGRAM COMPLETE: CPT | Performed by: PHYSICIAN ASSISTANT

## 2020-01-29 PROCEDURE — 1036F TOBACCO NON-USER: CPT | Performed by: PHYSICIAN ASSISTANT

## 2020-01-29 NOTE — ASSESSMENT & PLAN NOTE
CBC CMP lipid panel and TSH just done in all within normal limits  EKG done today and normal   Likely anxiety and patient is where this now that she knows all of her blood work in EKG was normal she feels a lot better  She is able to exercise at the gym without any chest pain or shortness of breath

## 2020-01-29 NOTE — PROGRESS NOTES
Assessment and Plan:    Problem List Items Addressed This Visit        Endocrine    Multiple thyroid nodules     Over due for US  Reordered  Relevant Orders    US thyroid       Musculoskeletal and Integument    Osteopenia of multiple sites     DEXA order reprinted and overdue  Other    Iron deficiency anemia due to chronic blood loss     CBC normal           Breast cancer screening    Relevant Orders    Mammo screening bilateral w 3d & cad    Anxiety     Likely the cause of her chest pains  On Effexor in the past and was hard to get off so does not want to initiate meds at this time  Chest pain     CBC CMP lipid panel and TSH just done in all within normal limits  EKG done today and normal   Likely anxiety and patient is where this now that she knows all of her blood work in EKG was normal she feels a lot better  She is able to exercise at the gym without any chest pain or shortness of breath  Healthcare maintenance     Mammo ordered  Other Visit Diagnoses     Chest tightness    -  Primary    Relevant Orders    POCT ECG (Completed)    Need for hepatitis C screening test        Relevant Orders    Hepatitis C antibody    Menopause        Relevant Orders    DXA bone density spine hip and pelvis                 Diagnoses and all orders for this visit:    Chest tightness  -     POCT ECG    Need for hepatitis C screening test  -     Hepatitis C antibody; Future    Multiple thyroid nodules  -     US thyroid; Future    Breast cancer screening  -     Mammo screening bilateral w 3d & cad; Future    Menopause  -     DXA bone density spine hip and pelvis; Future    Osteopenia of multiple sites    Iron deficiency anemia due to chronic blood loss    Anxiety    Chest pain, unspecified type    Healthcare maintenance    Other orders  -     guaiFENesin (HERBAL EXPEC PO); Take by mouth              Subjective:      Patient ID: Nini Feldman is a 62 y o  female      CC:    Chief Complaint   Patient presents with    Chest Pain     Patient present today for possible stress/ chest prssure radiating down to her right hand  including numbness for the past month or 2  HPI:    Pt here today just after doing blood work with similar symptoms as one year ago  Getting intermittent dull tight or burnychest pain central with radiation to her back more so when over thinking  Tends to hold her breathe and tense up her chest muscles  At times pain of ache and numbing feeling down her R arm and into her R neck a tight squeezing discomfort  Symptoms increase with stress  She has a very high deductible and did not get work up done yet  When she goes to the gym she has no pain at that time even with classes or elliptical  Has been getting more heart burn than usual lately  Discomfort lasts anywhere from minutes to hour  The following portions of the patient's history were reviewed and updated as appropriate: allergies, current medications, past family history, past medical history, past social history, past surgical history and problem list       Review of Systems   Constitutional: Negative  HENT: Negative  Eyes: Negative  Respiratory: Negative  Cardiovascular: Positive for chest pain  Gastrointestinal: Negative  Endocrine: Negative  Genitourinary: Negative  Musculoskeletal: Negative  Skin: Negative  Allergic/Immunologic: Negative  Neurological: Negative  Hematological: Negative  Psychiatric/Behavioral: The patient is nervous/anxious  Data to review:       Objective:    Vitals:    01/29/20 1359   BP: 98/62   BP Location: Left arm   Patient Position: Sitting   Cuff Size: Standard   Pulse: 96   Resp: 18   Temp: 99 1 °F (37 3 °C)   TempSrc: Temporal   Weight: 58 1 kg (128 lb)   Height: 5' 3" (1 6 m)        Physical Exam   Constitutional: She is oriented to person, place, and time  She appears well-developed and well-nourished  No distress     HENT: Head: Normocephalic and atraumatic  Eyes: Conjunctivae are normal  Right eye exhibits no discharge  Left eye exhibits no discharge  Neck: Neck supple  Carotid bruit is not present  Cardiovascular: Normal rate, regular rhythm and normal heart sounds  Exam reveals no gallop and no friction rub  No murmur heard  Pulmonary/Chest: Effort normal and breath sounds normal  No respiratory distress  She has no wheezes  She has no rales  Neurological: She is alert and oriented to person, place, and time  Skin: Skin is warm and dry  She is not diaphoretic  Psychiatric: She has a normal mood and affect  Judgment normal    Nursing note and vitals reviewed

## 2020-01-29 NOTE — PATIENT INSTRUCTIONS
Problem List Items Addressed This Visit        Endocrine    Multiple thyroid nodules     Over due for US  Reordered  Relevant Orders    US thyroid       Musculoskeletal and Integument    Osteopenia of multiple sites     DEXA order reprinted and overdue  Other    Iron deficiency anemia due to chronic blood loss     CBC normal           Breast cancer screening    Relevant Orders    Mammo screening bilateral w 3d & cad    Anxiety     Likely the cause of her chest pains  On Effexor in the past and was hard to get off so does not want to initiate meds at this time  Chest pain     CBC CMP lipid panel and TSH just done in all within normal limits  EKG done today and normal   Likely anxiety and patient is where this now that she knows all of her blood work in EKG was normal she feels a lot better  She is able to exercise at the gym without any chest pain or shortness of breath  Healthcare maintenance     Mammo ordered              Other Visit Diagnoses     Chest tightness    -  Primary    Relevant Orders    POCT ECG (Completed)    Need for hepatitis C screening test        Relevant Orders    Hepatitis C antibody    Menopause        Relevant Orders    DXA bone density spine hip and pelvis

## 2021-02-17 ENCOUNTER — OFFICE VISIT (OUTPATIENT)
Dept: FAMILY MEDICINE CLINIC | Facility: CLINIC | Age: 56
End: 2021-02-17
Payer: COMMERCIAL

## 2021-02-17 VITALS
SYSTOLIC BLOOD PRESSURE: 110 MMHG | TEMPERATURE: 97.9 F | DIASTOLIC BLOOD PRESSURE: 76 MMHG | WEIGHT: 129 LBS | HEIGHT: 64 IN | HEART RATE: 76 BPM | BODY MASS INDEX: 22.02 KG/M2

## 2021-02-17 DIAGNOSIS — Z12.31 ENCOUNTER FOR SCREENING MAMMOGRAM FOR MALIGNANT NEOPLASM OF BREAST: Primary | ICD-10-CM

## 2021-02-17 DIAGNOSIS — Z13.220 LIPID SCREENING: ICD-10-CM

## 2021-02-17 DIAGNOSIS — E04.2 MULTIPLE THYROID NODULES: ICD-10-CM

## 2021-02-17 DIAGNOSIS — M85.89 OSTEOPENIA OF MULTIPLE SITES: ICD-10-CM

## 2021-02-17 DIAGNOSIS — Z00.00 ENCOUNTER FOR PHYSICAL EXAMINATION: ICD-10-CM

## 2021-02-17 DIAGNOSIS — D50.0 IRON DEFICIENCY ANEMIA DUE TO CHRONIC BLOOD LOSS: ICD-10-CM

## 2021-02-17 PROBLEM — R07.89 ATYPICAL CHEST PAIN: Status: RESOLVED | Noted: 2019-02-06 | Resolved: 2021-02-17

## 2021-02-17 PROBLEM — Z12.39 BREAST CANCER SCREENING: Status: RESOLVED | Noted: 2019-02-06 | Resolved: 2021-02-17

## 2021-02-17 PROBLEM — R07.9 CHEST PAIN: Status: RESOLVED | Noted: 2020-01-29 | Resolved: 2021-02-17

## 2021-02-17 PROBLEM — L82.1 SEBORRHEIC KERATOSIS: Status: ACTIVE | Noted: 2021-02-17

## 2021-02-17 PROCEDURE — 99396 PREV VISIT EST AGE 40-64: CPT | Performed by: PHYSICIAN ASSISTANT

## 2021-02-17 NOTE — PATIENT INSTRUCTIONS
Problem List Items Addressed This Visit        Endocrine    Multiple thyroid nodules       Musculoskeletal and Integument    Osteopenia of multiple sites       Other    Iron deficiency anemia due to chronic blood loss    RESOLVED: Breast cancer screening - Primary      Other Visit Diagnoses     Lipid screening

## 2021-02-17 NOTE — PROGRESS NOTES
Assessment and Plan:    Problem List Items Addressed This Visit        Other    Breast cancer screening - Primary      Other Visit Diagnoses     Lipid screening                     {Assess/PlanSmartLinks:76062}          Subjective:      Patient ID: Pham Nagy is a 60 y o  female  CC:    Chief Complaint   Patient presents with    Anxiety    Anemia     Pt states there are no other concerns at this time  -  [facility]       HPI:    HPI    {Common ambulatory SmartLinks:89054}      Review of Systems      Data to review:       Objective:    Vitals:    21 1148   BP: 110/76   BP Location: Left arm   Patient Position: Sitting   Cuff Size: Large   Pulse: 76   Temp: 97 9 °F (36 6 °C)   TempSrc: Oral   Weight: 58 5 kg (129 lb)   Height: 5' 3 5" (1 613 m)        Physical Exam    BMI Counseling: Body mass index is 22 49 kg/m²   The BMI {VB BMI Counselin}

## 2021-02-17 NOTE — PROGRESS NOTES
[facility]ULT [facility]NUAL [address] [facility]    NAME: Nicole Paulson  AGE: 60 y o  SEX: female  : 1965     DATE: 2021       Patient actually has horrible health insurance and does not usually like to go for the thing she needs because it is actually not covered even though she pays 6000 dollars a year  She is overdue for most of her needs  Assessment and Plan:     Problem List Items Addressed This Visit        Endocrine    Multiple thyroid nodules     Thyroid US last done  with new nodule on L  US orders given  Relevant Orders    TSH, 3rd generation with Free T4 reflex    US thyroid       Musculoskeletal and Integument    Osteopenia of multiple sites     Last DEXA was in 2016  Repeat order given today  Relevant Orders    DXA bone density spine hip and pelvis       Other    Iron deficiency anemia due to chronic blood loss     Still on iron supplements but no longer having her menses dso may not need this anymore  Check CBC  Relevant Orders    CBC and differential    Encounter for physical examination     Fasting labs, DEXA, mammo and thyroid US  Were ordered today  Colonoscopy up-to-date until   Patient up-to-date with eye doctor and dentist   Patient does have gyn  RESOLVED: Breast cancer screening - Primary    Relevant Orders    Mammo screening bilateral w 3d & cad      Other Visit Diagnoses     Lipid screening        Relevant Orders    Lipid panel    Comprehensive metabolic panel          Immunizations and preventive care screenings were discussed with patient today  Appropriate education was printed on patient's after visit summary  Counseling:  Dental Health: discussed importance of regular tooth brushing, flossing, and dental visits  · Exercise: the importance of regular exercise/physical activity was discussed  Recommend exercise 3-5 times per week for at least 30 minutes            No follow-ups on file      Chief Complaint:     Chief Complaint   Patient presents with    Physical Exam     Yearly [facility]      History of Present Illness:     Adult Annual Physical   Patient here for a comprehensive physical exam  The patient reports problems - none  Diet and Physical Activity  · Diet/Nutrition: well balanced diet  · Exercise: walking  Depression Screening  PHQ-9 Depression Screening    PHQ-9:   Frequency of the following problems over the past two weeks:           General Health  · Sleep: sleeps well  · Hearing: normal - bilateral   · Vision: most recent eye exam >1 year ago  · Dental: regular dental visits  /GYN Health  · Last menstrual period: no longer getting  · Contraceptive method: none  · History of STDs?: no      Review of Systems:     Review of Systems   Constitutional: Negative  HENT: Negative  Eyes: Negative  Respiratory: Negative  Cardiovascular: Negative  Gastrointestinal: Negative  Endocrine: Negative  Genitourinary: Negative  Musculoskeletal: Negative  Skin: Negative  Allergic/Immunologic: Negative  Neurological: Negative  Hematological: Negative  Psychiatric/Behavioral: Negative  Past Medical History:     Past Medical History:   Diagnosis Date    Abnormal cervical Papanicolaou smear     with positive HPV DNA test; resolved 07/10/2017    Anemia     Depression     Endometrial polyp     Herniated cervical disc     Herniated disc, cervical     Insomnia     Menorrhagia     D&C  today 7/26/2017    Osteopenia     Reflux esophagitis     occaisonal    Thyroid nodule       Past Surgical History:     Past Surgical History:   Procedure Laterality Date    BIOPSY CORE NEEDLE      of thyroid    CERVICAL BIOPSY      COLONOSCOPY N/A 3/14/2016    Procedure: COLONOSCOPY;  Surgeon: Elaine Mckeon MD;  Location: [facility];   Service:     COLPOSCOPY W/ BIOPSY / CURETTAGE      colposcopy cervix with biopsy(s) with endocervical curettage  NASAL SINUS SURGERY      OTHER SURGICAL HISTORY Right     fatty tumor removed from arm    OTHER SURGICAL HISTORY      Arm Incision    TX HYSTEROSCOPY,W/ENDO BX N/A 7/26/2017    Procedure: DILATATION AND CURETTAGE (D&C) WITH HYSTEROSCOPY POLYPECTOMY;  Surgeon: Zo Jerez MD;  Location: [facility];  Service: Gynecology    SINUS SURGERY      deviated septum      Social History:        Social History     Socioeconomic History    Marital status: /Civil Union     Spouse name: None    Number of children: 2    Years of education: None    Highest education level: None   Occupational History    None   Social Needs    Financial resource strain: None    Food insecurity     Worry: None     Inability: None    Transportation needs     Medical: None     Non-medical: None   Tobacco Use    Smoking status: Never Smoker    Smokeless tobacco: Never Used    Tobacco comment: pt states she qiut in 1982   Substance and Sexual Activity    Alcohol use:  Yes     Alcohol/week: 4 0 standard drinks     Types: 4 Glasses of wine per week     Comment: per week; social alcohol use    Drug use: No    Sexual activity: None   Lifestyle    Physical activity     Days per week: None     Minutes per session: None    Stress: None   Relationships    Social connections     Talks on phone: None     Gets together: None     Attends Mandaeism service: None     Active member of club or organization: None     Attends meetings of clubs or organizations: None     Relationship status: None    Intimate partner violence     Fear of current or ex partner: None     Emotionally abused: None     Physically abused: None     Forced sexual activity: None   Other Topics Concern    None   Social History Narrative    Always uses seat belt    Caffeine use    [employer] (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[employer])    Lives with parents     ( as per [employer])      Family History:     Family History   Problem Relation Age of Onset    Other Mother cardiac disorder, cardiac pacemaker    Stroke Mother         CVA    Lymphoma Mother     Osteoporosis Mother     Heart failure Mother     Asthma Father     Breast cancer Sister     Osteoarthritis Sister     Lung cancer Maternal Grandmother     Diabetes Maternal Grandfather     Parkinsonism Maternal Grandfather     Lymphoma Paternal Grandmother     Lymphoma Maternal Aunt       Current Medications:     Current Outpatient Medications   Medication Sig Dispense Refill    Calcium Carbonate 1500 (600 Ca) MG TABS Take by mouth      guaiFENesin (HERBAL EXPEC PO) Take by mouth      IRON PO Take 325 mg by mouth daily        Multiple Vitamins-Minerals (MULTI COMPLETE PO) Take 1 tablet by mouth daily  No current facility-administered medications for this visit  Allergies:     No Known Allergies   Physical Exam:     /76 (BP Location: Left arm, Patient Position: Sitting, Cuff Size: Large)   Pulse 76   Temp 97 9 °F (36 6 °C) (Oral)   Ht 5' 3 5" (1 613 m)   Wt 58 5 kg (129 lb)   BMI 22 49 kg/m²     Physical Exam  Vitals signs and nursing note reviewed  Constitutional:       General: She is not in acute distress  Appearance: She is well-developed  She is not diaphoretic  HENT:      Head: Normocephalic and atraumatic  Right Ear: External ear normal       Left Ear: External ear normal       Nose: Nose normal       Mouth/Throat:      Pharynx: No oropharyngeal exudate  Eyes:      General: No scleral icterus  Right eye: No discharge  Left eye: No discharge  Conjunctiva/sclera: Conjunctivae normal       Pupils: Pupils are equal, round, and reactive to light  Neck:      Musculoskeletal: Normal range of motion and neck supple  Thyroid: No thyromegaly  Vascular: No JVD  Trachea: No tracheal deviation  Cardiovascular:      Rate and Rhythm: Normal rate and regular rhythm  Heart sounds: Normal heart sounds  No murmur  No friction rub  No gallop  Pulmonary:      Effort: Pulmonary effort is normal  No respiratory distress  Breath sounds: Normal breath sounds  No stridor  No wheezing or rales  Chest:      Chest wall: No tenderness  Abdominal:      General: Bowel sounds are normal  There is no distension  Palpations: Abdomen is soft  There is no mass  Tenderness: There is no abdominal tenderness  There is no guarding or rebound  Hernia: No hernia is present  Musculoskeletal: Normal range of motion  General: No deformity  Lymphadenopathy:      Cervical: No cervical adenopathy  Skin:     General: Skin is warm and dry  Capillary Refill: Capillary refill takes more than 3 seconds  Findings: No rash  Comments:   Patient does have light brown waxy looking oval-shaped lesion roughly 1 cm bilateral temple area of the face and slightly bigger lesion on the left shoulder all consistent with seborrheic keratosis diagnosis  Neurological:      Mental Status: She is alert and oriented to person, place, and time  Motor: No abnormal muscle tone  Coordination: Coordination normal       Deep Tendon Reflexes: Reflexes normal    Psychiatric:         Behavior: Behavior normal          Thought Content:  Thought content normal          Judgment: Judgment normal           Shreya Adam PA-C   [facility]

## 2021-02-17 NOTE — ASSESSMENT & PLAN NOTE
Fasting labs, DEXA, mammo and thyroid US  Were ordered today  Colonoscopy up-to-date until 2026  Patient up-to-date with eye doctor and dentist   Patient does have gyn

## 2021-02-19 ENCOUNTER — TELEPHONE (OUTPATIENT)
Dept: FAMILY MEDICINE CLINIC | Facility: CLINIC | Age: 56
End: 2021-02-19

## 2021-02-19 NOTE — TELEPHONE ENCOUNTER
PT IS HAVING SOME TROUBLE WITH HER INSUR COVERING HER VISITS  SHE HAS A HISTORY OF THYROID NODULES, SHE GETS THEM CHECKED FROM TIME TO TIME, DOES NOT TAKE ANY MEDS AND ISN'T BEING ACTIVELY TREATED FOR ANYTHING  HER ISUR IS ASKING FOR A LETTER STATING THAT THE PT IS NOT BEING TREATED AND HAS A HISTORY OF NODULES  PT WOULD LIKE TO EXPLAIN FURTHER TO Rebecca Napier    PLEASE CALL BACK [phone]

## 2021-02-22 ENCOUNTER — HOSPITAL ENCOUNTER (OUTPATIENT)
Dept: ULTRASOUND IMAGING | Facility: MEDICAL CENTER | Age: 56
Discharge: HOME | End: 2021-02-22
Payer: COMMERCIAL

## 2021-02-22 DIAGNOSIS — E04.2 MULTIPLE THYROID NODULES: ICD-10-CM

## 2021-02-22 PROCEDURE — 76536 US EXAM OF HEAD AND NECK: CPT

## 2021-02-22 NOTE — TELEPHONE ENCOUNTER
I wrote a short letter for pt  If we can make sure it is appropriate with the pt and see if she needs it faxed or to   Thank you

## 2021-02-27 DIAGNOSIS — E04.2 MULTIPLE THYROID NODULES: Primary | ICD-10-CM

## 2021-02-27 NOTE — RESULT ENCOUNTER NOTE
Please let pt know that her thyroid US is unchanged from the last [address]  Bx is not needed but it is recommended to repeat US in 1 year  Order is placed

## 2021-03-01 ENCOUNTER — HOSPITAL ENCOUNTER (OUTPATIENT)
Dept: MAMMOGRAPHY | Facility: MEDICAL CENTER | Age: 56
Discharge: HOME | End: 2021-03-01
Payer: COMMERCIAL

## 2021-03-01 VITALS — WEIGHT: 129 LBS | BODY MASS INDEX: 22.02 KG/M2 | HEIGHT: 64 IN

## 2021-03-01 DIAGNOSIS — Z12.31 ENCOUNTER FOR SCREENING MAMMOGRAM FOR MALIGNANT NEOPLASM OF BREAST: ICD-10-CM

## 2021-03-01 PROCEDURE — 77063 BREAST TOMOSYNTHESIS BI: CPT

## 2021-03-01 PROCEDURE — 77067 SCR MAMMO BI INCL CAD: CPT

## 2021-04-13 DIAGNOSIS — Z23 ENCOUNTER FOR IMMUNIZATION: ICD-10-CM

## 2021-10-25 ENCOUNTER — OFFICE VISIT (OUTPATIENT)
Dept: FAMILY MEDICINE CLINIC | Facility: CLINIC | Age: 56
End: 2021-10-25
Payer: COMMERCIAL

## 2021-10-25 VITALS
OXYGEN SATURATION: 98 % | HEIGHT: 64 IN | BODY MASS INDEX: 22.71 KG/M2 | RESPIRATION RATE: 18 BRPM | HEART RATE: 76 BPM | SYSTOLIC BLOOD PRESSURE: 102 MMHG | DIASTOLIC BLOOD PRESSURE: 56 MMHG | WEIGHT: 133 LBS

## 2021-10-25 DIAGNOSIS — F32.A DEPRESSION, UNSPECIFIED DEPRESSION TYPE: ICD-10-CM

## 2021-10-25 DIAGNOSIS — F41.9 ANXIETY: Primary | ICD-10-CM

## 2021-10-25 PROCEDURE — 99214 OFFICE O/P EST MOD 30 MIN: CPT | Performed by: PHYSICIAN ASSISTANT

## 2021-10-25 RX ORDER — ESCITALOPRAM OXALATE 5 MG/1
5 TABLET ORAL DAILY
Qty: 30 TABLET | Refills: 5 | Status: SHIPPED | OUTPATIENT
Start: 2021-10-25 | End: 2022-01-18 | Stop reason: SDUPTHER

## 2021-10-25 RX ORDER — ALPRAZOLAM 0.25 MG/1
0.25 TABLET ORAL
Qty: 20 TABLET | Refills: 0 | Status: SHIPPED | OUTPATIENT
Start: 2021-10-25 | End: 2021-11-22 | Stop reason: SDUPTHER

## 2021-11-22 DIAGNOSIS — F41.9 ANXIETY: ICD-10-CM

## 2021-11-22 RX ORDER — ALPRAZOLAM 0.25 MG/1
0.25 TABLET ORAL
Qty: 20 TABLET | Refills: 0 | Status: SHIPPED | OUTPATIENT
Start: 2021-11-22 | End: 2021-12-15 | Stop reason: SDUPTHER

## 2021-12-15 DIAGNOSIS — F41.9 ANXIETY: ICD-10-CM

## 2021-12-15 RX ORDER — ALPRAZOLAM 0.25 MG/1
0.25 TABLET ORAL
Qty: 20 TABLET | Refills: 0 | OUTPATIENT
Start: 2021-12-15

## 2021-12-15 RX ORDER — ALPRAZOLAM 0.25 MG/1
0.25 TABLET ORAL
Qty: 20 TABLET | Refills: 0 | Status: SHIPPED | OUTPATIENT
Start: 2021-12-15 | End: 2022-01-18 | Stop reason: SDUPTHER

## 2022-01-11 ENCOUNTER — TELEPHONE (OUTPATIENT)
Dept: FAMILY MEDICINE CLINIC | Facility: CLINIC | Age: 57
End: 2022-01-11

## 2022-01-11 NOTE — TELEPHONE ENCOUNTER
Pt is calling back regarding her conversation with Rajinder Conroy on My Chart  Rajinder Conroy responded on 1/5 but pt did not see the response  Pt stated on 1/5 that she has heart fluttering, probably because of a medication side effect  Rajinder Marycarmen recommended her coming back sooner to have an EKG done before her appointment on 1/18 to confirm  I offered pt an emergency same-day appointment for tomorrow, but pt wants to do everything that same day - med review, EKG, and she also thinks she has carpal tunnel so she wants to discuss that new issue as well   I advised that we would not have enough time to complete all of that tomorrow because it is only a 15 minute appointment and we would need longer to address everything  This appointment would be for the EKG /heart fluttering  Pt would rather wait until her appointment on the 18th to do everything at once, unless Rajinder Conroy thinks the 15 minutes tomorrow is ok to do everything? Please advise, thank you

## 2022-01-11 NOTE — TELEPHONE ENCOUNTER
Pt needs to have blood work done before her apt as well  They were ordered 2/2021 and never done and now with pt having fluttering these NEED to be done preferably before her apt  Pt needs more than 15 min apt to address all issues and do EKG please

## 2022-01-14 LAB
ALBUMIN SERPL-MCNC: 4 G/DL (ref 3.6–5.1)
ALBUMIN/GLOB SERPL: 1.4 (CALC) (ref 1–2.5)
ALP SERPL-CCNC: 54 U/L (ref 37–153)
ALT SERPL-CCNC: 13 U/L (ref 6–29)
AST SERPL-CCNC: 18 U/L (ref 10–35)
BASOPHILS # BLD AUTO: 38 CELLS/UL (ref 0–200)
BASOPHILS NFR BLD AUTO: 0.7 %
BILIRUB SERPL-MCNC: 0.7 MG/DL (ref 0.2–1.2)
BUN SERPL-MCNC: 14 MG/DL (ref 7–25)
BUN/CREAT SERPL: NORMAL (CALC) (ref 6–22)
CALCIUM SERPL-MCNC: 9.1 MG/DL (ref 8.6–10.4)
CHLORIDE SERPL-SCNC: 103 MMOL/L (ref 98–110)
CHOLEST SERPL-MCNC: 199 MG/DL
CHOLEST/HDLC SERPL: 2.4 (CALC)
CO2 SERPL-SCNC: 29 MMOL/L (ref 20–32)
CREAT SERPL-MCNC: 0.69 MG/DL (ref 0.5–1.05)
EOSINOPHIL # BLD AUTO: 302 CELLS/UL (ref 15–500)
EOSINOPHIL NFR BLD AUTO: 5.6 %
ERYTHROCYTE [DISTWIDTH] IN BLOOD BY AUTOMATED COUNT: 12.6 % (ref 11–15)
GLOBULIN SER CALC-MCNC: 2.9 G/DL (CALC) (ref 1.9–3.7)
GLUCOSE SERPL-MCNC: 93 MG/DL (ref 65–99)
HCT VFR BLD AUTO: 37.7 % (ref 35–45)
HDLC SERPL-MCNC: 82 MG/DL
HGB BLD-MCNC: 12.5 G/DL (ref 11.7–15.5)
LDLC SERPL CALC-MCNC: 105 MG/DL (CALC)
LYMPHOCYTES # BLD AUTO: 1836 CELLS/UL (ref 850–3900)
LYMPHOCYTES NFR BLD AUTO: 34 %
MCH RBC QN AUTO: 30.1 PG (ref 27–33)
MCHC RBC AUTO-ENTMCNC: 33.2 G/DL (ref 32–36)
MCV RBC AUTO: 90.8 FL (ref 80–100)
MONOCYTES # BLD AUTO: 659 CELLS/UL (ref 200–950)
MONOCYTES NFR BLD AUTO: 12.2 %
NEUTROPHILS # BLD AUTO: 2565 CELLS/UL (ref 1500–7800)
NEUTROPHILS NFR BLD AUTO: 47.5 %
NONHDLC SERPL-MCNC: 117 MG/DL (CALC)
PLATELET # BLD AUTO: 268 THOUSAND/UL (ref 140–400)
PMV BLD REES-ECKER: 10.9 FL (ref 7.5–12.5)
POTASSIUM SERPL-SCNC: 5.1 MMOL/L (ref 3.5–5.3)
PROT SERPL-MCNC: 6.9 G/DL (ref 6.1–8.1)
RBC # BLD AUTO: 4.15 MILLION/UL (ref 3.8–5.1)
SL AMB EGFR AFRICAN AMERICAN: 111 ML/MIN/1.73M2
SL AMB EGFR NON AFRICAN AMERICAN: 96 ML/MIN/1.73M2
SODIUM SERPL-SCNC: 137 MMOL/L (ref 135–146)
TRIGL SERPL-MCNC: 42 MG/DL
TSH SERPL-ACNC: 2.19 MIU/L (ref 0.4–4.5)
WBC # BLD AUTO: 5.4 THOUSAND/UL (ref 3.8–10.8)

## 2022-01-18 ENCOUNTER — OFFICE VISIT (OUTPATIENT)
Dept: FAMILY MEDICINE CLINIC | Facility: CLINIC | Age: 57
End: 2022-01-18
Payer: COMMERCIAL

## 2022-01-18 VITALS
HEIGHT: 64 IN | HEART RATE: 82 BPM | SYSTOLIC BLOOD PRESSURE: 104 MMHG | RESPIRATION RATE: 14 BRPM | BODY MASS INDEX: 23.05 KG/M2 | TEMPERATURE: 98.6 F | DIASTOLIC BLOOD PRESSURE: 66 MMHG | WEIGHT: 135 LBS

## 2022-01-18 DIAGNOSIS — M85.89 OSTEOPENIA OF MULTIPLE SITES: ICD-10-CM

## 2022-01-18 DIAGNOSIS — F32.A DEPRESSION, UNSPECIFIED DEPRESSION TYPE: ICD-10-CM

## 2022-01-18 DIAGNOSIS — F41.9 ANXIETY: Primary | ICD-10-CM

## 2022-01-18 DIAGNOSIS — E04.2 MULTIPLE THYROID NODULES: ICD-10-CM

## 2022-01-18 PROCEDURE — 3008F BODY MASS INDEX DOCD: CPT | Performed by: PHYSICIAN ASSISTANT

## 2022-01-18 PROCEDURE — 99214 OFFICE O/P EST MOD 30 MIN: CPT | Performed by: PHYSICIAN ASSISTANT

## 2022-01-18 PROCEDURE — 1036F TOBACCO NON-USER: CPT | Performed by: PHYSICIAN ASSISTANT

## 2022-01-18 RX ORDER — ALPRAZOLAM 0.25 MG/1
0.25 TABLET ORAL
Qty: 20 TABLET | Refills: 0 | Status: SHIPPED | OUTPATIENT
Start: 2022-01-18

## 2022-01-18 RX ORDER — ESCITALOPRAM OXALATE 10 MG/1
10 TABLET ORAL DAILY
Qty: 30 TABLET | Refills: 5 | Status: SHIPPED | OUTPATIENT
Start: 2022-01-18 | End: 2022-06-09 | Stop reason: SDUPTHER

## 2022-01-18 NOTE — PATIENT INSTRUCTIONS
Problem List Items Addressed This Visit        Endocrine    Multiple thyroid nodules     Patient is due for thyroid ultrasound this year order given  Relevant Orders    US thyroid       Musculoskeletal and Integument    Osteopenia of multiple sites     DEXA due patient has better insurance will be able to schedule order reprinted  Other    Depression     Much improved Lexapro 5 mg once daily without SI or HI  Relevant Medications    escitalopram (LEXAPRO) 10 mg tablet    ALPRAZolam (XANAX) 0 25 mg tablet    Anxiety - Primary     Improved but still needing to use half of an Xanax at least every other day  Will increase Lexapro to 10 mg once daily           Relevant Medications    escitalopram (LEXAPRO) 10 mg tablet    ALPRAZolam (XANAX) 0 25 mg tablet

## 2022-01-18 NOTE — ASSESSMENT & PLAN NOTE
Improved but still needing to use half of an Xanax at least every other day  Will increase Lexapro to 10 mg once daily

## 2022-01-18 NOTE — PROGRESS NOTES
Assessment and Plan:    Problem List Items Addressed This Visit        Endocrine    Multiple thyroid nodules     Patient is due for thyroid ultrasound this year order given  Relevant Orders    US thyroid       Musculoskeletal and Integument    Osteopenia of multiple sites     DEXA due patient has better insurance will be able to schedule order reprinted  Other    Depression     Much improved Lexapro 5 mg once daily without SI or HI  Relevant Medications    escitalopram (LEXAPRO) 10 mg tablet    ALPRAZolam (XANAX) 0 25 mg tablet    Anxiety - Primary     Improved but still needing to use half of an Xanax at least every other day  Will increase Lexapro to 10 mg once daily  Relevant Medications    escitalopram (LEXAPRO) 10 mg tablet    ALPRAZolam (XANAX) 0 25 mg tablet                 Diagnoses and all orders for this visit:    Anxiety  -     escitalopram (LEXAPRO) 10 mg tablet; Take 1 tablet (10 mg total) by mouth daily  -     ALPRAZolam (XANAX) 0 25 mg tablet; Take 1 tablet (0 25 mg total) by mouth daily at bedtime as needed for anxiety    Depression, unspecified depression type  -     escitalopram (LEXAPRO) 10 mg tablet; Take 1 tablet (10 mg total) by mouth daily    Multiple thyroid nodules  -     US thyroid; Future    Osteopenia of multiple sites              Subjective:      Patient ID: Pratibha Miranda is a 60 y o  female  CC:    Chief Complaint   Patient presents with    Follow-up     Patient here for follow up for medication and hert fluttering due to anxiety medication       HPI:    Patient here today to follow-up on initiation of Lexapro 5 mg once daily next over she is also due for thyroid ultrasound and DEXA scan  Patient states that her depression is much better she feels great without SI or HI in the 5 mg once daily however her anxiety is only partially improved and this is when she needs take half of the Xanax almost every other day regularly    She states that her stressors in life including a stressful job getting a divorce moving finding a new place to live is still overwhelming for her  She does find that she gets palpitations when quiet and thinking about her stress but then during movement and exercise she has no problems with any of the symptoms no chest pain or shortness of breath  The following portions of the patient's history were reviewed and updated as appropriate: allergies, current medications, past family history, past medical history, past social history, past surgical history and problem list       Review of Systems   Constitutional: Negative  Negative for chills and fever  HENT: Negative  Negative for ear pain and sore throat  Eyes: Negative  Negative for pain and visual disturbance  Respiratory: Negative  Negative for cough and shortness of breath  Cardiovascular: Negative  Negative for chest pain and palpitations  Heart fluttering   Gastrointestinal: Negative  Negative for abdominal pain and vomiting  Endocrine: Negative  Genitourinary: Negative  Negative for dysuria and hematuria  Musculoskeletal: Negative  Negative for arthralgias and back pain  Skin: Negative  Negative for color change and rash  Allergic/Immunologic: Negative  Neurological: Negative  Negative for seizures and syncope  Hematological: Negative  Psychiatric/Behavioral: The patient is nervous/anxious  All other systems reviewed and are negative  Data to review:       Objective:    Vitals:    01/18/22 1301   BP: 104/66   BP Location: Left arm   Patient Position: Sitting   Cuff Size: Adult   Pulse: 82   Resp: 14   Temp: 98 6 °F (37 °C)   TempSrc: Temporal   Weight: 61 2 kg (135 lb)   Height: 5' 3 5" (1 613 m)        Physical Exam  Vitals and nursing note reviewed  Constitutional:       Appearance: Normal appearance  She is well-developed  HENT:      Head: Normocephalic and atraumatic     Eyes:      General: Lids are normal  Conjunctiva/sclera: Conjunctivae normal       Pupils: Pupils are equal, round, and reactive to light  Cardiovascular:      Rate and Rhythm: Normal rate and regular rhythm  Heart sounds: No murmur heard  Pulmonary:      Effort: Pulmonary effort is normal       Breath sounds: Normal breath sounds  Skin:     General: Skin is warm and dry  Neurological:      General: No focal deficit present  Mental Status: She is alert  Coordination: Coordination is intact  Psychiatric:         Mood and Affect: Mood normal          Behavior: Behavior normal  Behavior is cooperative  Thought Content:  Thought content normal          Judgment: Judgment normal

## 2022-06-09 DIAGNOSIS — F32.A DEPRESSION, UNSPECIFIED DEPRESSION TYPE: ICD-10-CM

## 2022-06-09 DIAGNOSIS — F41.9 ANXIETY: ICD-10-CM

## 2022-06-09 RX ORDER — ESCITALOPRAM OXALATE 10 MG/1
10 TABLET ORAL DAILY
Qty: 30 TABLET | Refills: 5 | Status: SHIPPED | OUTPATIENT
Start: 2022-06-09

## 2022-07-19 ENCOUNTER — HOSPITAL ENCOUNTER (OUTPATIENT)
Dept: RADIOLOGY | Age: 57
Discharge: HOME | End: 2022-07-19
Payer: COMMERCIAL

## 2022-07-19 DIAGNOSIS — E04.2 MULTIPLE THYROID NODULES: ICD-10-CM

## 2022-07-19 PROCEDURE — 76536 US EXAM OF HEAD AND NECK: CPT

## 2022-07-19 NOTE — LETTER
[address] [place] [address]  [address] [address]      July 27, 2022    MRN: [id number]     Phone: [phone]     Dear Ms David Conn recently had a(n) Ultrasound performed on 7/19/2022 at  [address] [place] [address] that was requested by Catherine Smith PA-C  The study was reviewed by a radiologist, which is a physician who specializes in medical imaging  The radiologist issued a report describing his or her findings  In that report there was a finding that the radiologist felt warranted further discussion with your health care provider and that discussion would be beneficial to you  The results were sent to Catherine Smith PA-C on 07/23/2022  8:22 AM  We recommend that you contact Catherine Smith PA-C at [phone] or set up an appointment to discuss the results of the imaging test  If you have already heard from Catherine Smith PA-C regarding the results of your study, you can disregard this letter  This letter is not meant to alarm you, but intended to encourage you to follow-up on your results with the provider that sent you for the imaging study  In addition, we have enclosed answers to frequently asked questions by other patients who have also received a letter to review results with their health care provider (see page two)  Thank you for choosing [address] [place] [address] for your medical imaging needs  FREQUENTLY ASKED QUESTIONS    1  Why am I receiving this letter? [address] [place] [address] requires us to notify patients who have findings on imaging exams that may require more testing or follow-up with a health professional within the next 3 months          2  How serious is the finding on the imaging test?  This letter is sent to all patients who may need follow-up or more testing within the next 3 months  Receiving this letter does not necessarily mean you have a life-threatening imaging finding or disease  Recommendations in the radiologists imaging report are general in nature and it is up to your healthcare provider to say whether those recommendations make sense for your situation  You are strongly encouraged to talk to your health care provider about the results and ask whether additional steps need to be taken  3  Where can I get a copy of the final report for my recent radiology exam?  To get a full copy of the report you can access your records online at [email or web address] or please contact [address] [place] [address] Records Department at [phone] Monday through Friday between 8 am and 6 pm          4  What do I need to do now? Please contact your health care provider who requested the imaging study to discuss what further actions (if any) are needed  You may have already heard from (your ordering provider) in regard to this test in which case you can disregard this letter  NOTICE IN ACCORDANCE WITH THE [facility] PATIENT TEST RESULT INFORMATION ACT OF 2018    You are receiving this notice as a result of a determination by your diagnostic imaging service that further discussions of your test results are warranted and would be beneficial to you  The complete results of your test or tests have been or will be sent to the health care practitioner that ordered the test or tests  It is recommended that you contact your health care practitioner to discuss your results as soon as possible

## 2022-07-25 DIAGNOSIS — E04.2 MULTIPLE THYROID NODULES: Primary | ICD-10-CM

## 2022-07-25 NOTE — RESULT ENCOUNTER NOTE
Please let pt know that her thyroid US shows her nodule has changed and now it requires a biopsy  Order was placed

## 2022-08-05 ENCOUNTER — VBI (OUTPATIENT)
Dept: ADMINISTRATIVE | Facility: OTHER | Age: 57
End: 2022-08-05

## 2022-12-07 DIAGNOSIS — F41.9 ANXIETY: ICD-10-CM

## 2022-12-07 DIAGNOSIS — F32.A DEPRESSION, UNSPECIFIED DEPRESSION TYPE: ICD-10-CM

## 2022-12-07 RX ORDER — ESCITALOPRAM OXALATE 10 MG/1
10 TABLET ORAL DAILY
Qty: 30 TABLET | Refills: 0 | Status: SHIPPED | OUTPATIENT
Start: 2022-12-07

## 2022-12-07 RX ORDER — ALPRAZOLAM 0.25 MG/1
0.25 TABLET ORAL
Qty: 20 TABLET | Refills: 0 | Status: SHIPPED | OUTPATIENT
Start: 2022-12-07

## 2023-01-04 ENCOUNTER — OFFICE VISIT (OUTPATIENT)
Dept: FAMILY MEDICINE CLINIC | Facility: CLINIC | Age: 58
End: 2023-01-04

## 2023-01-04 VITALS
DIASTOLIC BLOOD PRESSURE: 64 MMHG | HEIGHT: 64 IN | BODY MASS INDEX: 23.22 KG/M2 | HEART RATE: 80 BPM | WEIGHT: 136 LBS | SYSTOLIC BLOOD PRESSURE: 110 MMHG

## 2023-01-04 DIAGNOSIS — M85.89 OSTEOPENIA OF MULTIPLE SITES: ICD-10-CM

## 2023-01-04 DIAGNOSIS — F32.A DEPRESSION, UNSPECIFIED DEPRESSION TYPE: ICD-10-CM

## 2023-01-04 DIAGNOSIS — Z13.1 SCREENING FOR DIABETES MELLITUS (DM): ICD-10-CM

## 2023-01-04 DIAGNOSIS — Z13.220 LIPID SCREENING: ICD-10-CM

## 2023-01-04 DIAGNOSIS — Z12.31 ENCOUNTER FOR SCREENING MAMMOGRAM FOR MALIGNANT NEOPLASM OF BREAST: Primary | ICD-10-CM

## 2023-01-04 DIAGNOSIS — E04.2 MULTIPLE THYROID NODULES: ICD-10-CM

## 2023-01-04 DIAGNOSIS — F41.9 ANXIETY: ICD-10-CM

## 2023-01-04 PROBLEM — Z86.16 HISTORY OF COVID-19: Status: ACTIVE | Noted: 2023-01-04

## 2023-01-04 RX ORDER — CHLORAL HYDRATE 500 MG
1000 CAPSULE ORAL DAILY
COMMUNITY

## 2023-01-04 RX ORDER — ALPRAZOLAM 0.25 MG/1
0.25 TABLET ORAL
Qty: 20 TABLET | Refills: 0 | Status: SHIPPED | OUTPATIENT
Start: 2023-01-04

## 2023-01-04 RX ORDER — ESCITALOPRAM OXALATE 10 MG/1
10 TABLET ORAL DAILY
Qty: 30 TABLET | Refills: 11 | Status: SHIPPED | OUTPATIENT
Start: 2023-01-04

## 2023-01-04 NOTE — ASSESSMENT & PLAN NOTE
PT did have a US done last year and bx order was placed but she did not set this up  Order reprinted

## 2023-01-04 NOTE — PATIENT INSTRUCTIONS
1  Encounter for screening mammogram for malignant neoplasm of breast  -     Mammo screening bilateral w 3d & cad; Future; Expected date: 01/04/2023    2  Osteopenia of multiple sites  Assessment & Plan:  DEXA due and order placed  Orders:  -     DXA bone density spine hip and pelvis; Future; Expected date: 01/04/2023  -     Vitamin D 25 hydroxy    3  Depression, unspecified depression type  Assessment & Plan:  Stable with lexapro and as needed xanax  NO SI or HI  Orders:  -     Comprehensive metabolic panel  -     CBC and differential  -     escitalopram (LEXAPRO) 10 mg tablet; Take 1 tablet (10 mg total) by mouth daily    4  Anxiety  Assessment & Plan:  Stable with lexapro and as needed xanax  Refills given a new contract signed  PMED up to date  Orders:  -     Comprehensive metabolic panel  -     escitalopram (LEXAPRO) 10 mg tablet; Take 1 tablet (10 mg total) by mouth daily  -     ALPRAZolam (XANAX) 0 25 mg tablet; Take 1 tablet (0 25 mg total) by mouth daily at bedtime as needed for anxiety    5  Lipid screening  -     Lipid panel    6  Screening for diabetes mellitus (DM)  -     Comprehensive metabolic panel    7  Multiple thyroid nodules  Assessment & Plan:  PT did have a US done last year and bx order was placed but she did not set this up  Order reprinted

## 2023-01-04 NOTE — PROGRESS NOTES
Name: Randall Uribe      : 1965      MRN: [id number]  Encounter Provider: Marielena Galicia PA-C  Encounter Date: 2023   Encounter department: [facility]    Assessment & Plan     1  Encounter for screening mammogram for malignant neoplasm of breast  -     Mammo screening bilateral w 3d & cad; Future; Expected date: 2023    2  Osteopenia of multiple sites  Assessment & Plan:  DEXA due and order placed  Orders:  -     DXA bone density spine hip and pelvis; Future; Expected date: 2023  -     Vitamin D 25 hydroxy    3  Depression, unspecified depression type  Assessment & Plan:  Stable with lexapro and as needed xanax  NO SI or HI  Orders:  -     Comprehensive metabolic panel  -     CBC and differential  -     escitalopram (LEXAPRO) 10 mg tablet; Take 1 tablet (10 mg total) by mouth daily    4  Anxiety  Assessment & Plan:  Stable with lexapro and as needed xanax  Refills given a new contract signed  PMED up to date  Orders:  -     Comprehensive metabolic panel  -     escitalopram (LEXAPRO) 10 mg tablet; Take 1 tablet (10 mg total) by mouth daily  -     ALPRAZolam (XANAX) 0 25 mg tablet; Take 1 tablet (0 25 mg total) by mouth daily at bedtime as needed for anxiety    5  Lipid screening  -     Lipid panel    6  Screening for diabetes mellitus (DM)  -     Comprehensive metabolic panel    7  Multiple thyroid nodules  Assessment & Plan:  PT did have a US done last year and bx order was placed but she did not set this up  Order reprinted  Subjective        Emelia Trevino is here for chronic conditions f/u including the diagnosis of Encounter for screening mammogram for malignant neoplasm of breast  (primary encounter diagnosis)   Pt  states they are taking all medications as directed without complaints or side effects  Review of Systems   Constitutional: Negative  HENT: Negative  Eyes: Negative  Respiratory: Negative      Cardiovascular: Negative  Gastrointestinal: Negative  Endocrine: Negative  Genitourinary: Negative  Musculoskeletal: Negative  Skin: Negative  Allergic/Immunologic: Negative  Neurological: Negative  Hematological: Negative  Psychiatric/Behavioral: Negative  Current Outpatient Medications on File Prior to Visit   Medication Sig   • Calcium Carbonate 1500 (600 Ca) MG TABS Take by mouth   • Ginkgo Biloba (GINKGO PO) Take by mouth   • Multiple Vitamins-Minerals (MULTI COMPLETE PO) Take 1 tablet by mouth daily  • Omega-3 Fatty Acids (fish oil) 1,000 mg Take 1,000 mg by mouth daily   • Probiotic Product (PROBIOTIC DAILY PO) Take by mouth   • [DISCONTINUED] ALPRAZolam (XANAX) 0 25 mg tablet Take 1 tablet (0 25 mg total) by mouth daily at bedtime as needed for anxiety   • [DISCONTINUED] escitalopram (LEXAPRO) 10 mg tablet Take 1 tablet (10 mg total) by mouth daily   • [DISCONTINUED] guaiFENesin (HERBAL EXPEC PO) Take by mouth       Objective     /64   Pulse 80   Ht 5' 3 5" (1 613 m)   Wt 61 7 kg (136 lb)   BMI 23 71 kg/m²     Physical Exam  Vitals and nursing note reviewed  Constitutional:       General: She is not in acute distress  Appearance: She is well-developed  She is not diaphoretic  HENT:      Head: Normocephalic and atraumatic  Eyes:      General:         Right eye: No discharge  Left eye: No discharge  Conjunctiva/sclera: Conjunctivae normal    Neck:      Vascular: No carotid bruit  Cardiovascular:      Rate and Rhythm: Normal rate and regular rhythm  Heart sounds: Normal heart sounds  No murmur heard  No friction rub  No gallop  Pulmonary:      Effort: Pulmonary effort is normal  No respiratory distress  Breath sounds: Normal breath sounds  No wheezing or rales  Musculoskeletal:      Cervical back: Neck supple  Skin:     General: Skin is warm and dry  Neurological:      Mental Status: She is alert and oriented to person, place, and time  Psychiatric:         Judgment: Judgment normal        Celeste Epley, PA-C

## 2023-01-12 LAB
25(OH)D3 SERPL-MCNC: 37 NG/ML (ref 30–100)
ALBUMIN SERPL-MCNC: 4.2 G/DL (ref 3.6–5.1)
ALBUMIN/GLOB SERPL: 1.4 (CALC) (ref 1–2.5)
ALP SERPL-CCNC: 50 U/L (ref 37–153)
ALT SERPL-CCNC: 14 U/L (ref 6–29)
AST SERPL-CCNC: 14 U/L (ref 10–35)
BASOPHILS # BLD AUTO: 39 CELLS/UL (ref 0–200)
BASOPHILS NFR BLD AUTO: 0.9 %
BILIRUB SERPL-MCNC: 0.6 MG/DL (ref 0.2–1.2)
BUN SERPL-MCNC: 15 MG/DL (ref 7–25)
BUN/CREAT SERPL: NORMAL (CALC) (ref 6–22)
CALCIUM SERPL-MCNC: 9.4 MG/DL (ref 8.6–10.4)
CHLORIDE SERPL-SCNC: 104 MMOL/L (ref 98–110)
CHOLEST SERPL-MCNC: 212 MG/DL
CHOLEST/HDLC SERPL: 2.4 (CALC)
CO2 SERPL-SCNC: 31 MMOL/L (ref 20–32)
CREAT SERPL-MCNC: 0.65 MG/DL (ref 0.5–1.03)
EOSINOPHIL # BLD AUTO: 323 CELLS/UL (ref 15–500)
EOSINOPHIL NFR BLD AUTO: 7.5 %
ERYTHROCYTE [DISTWIDTH] IN BLOOD BY AUTOMATED COUNT: 13.2 % (ref 11–15)
GFR/BSA.PRED SERPLBLD CYS-BASED-ARV: 101 ML/MIN/1.73M2
GLOBULIN SER CALC-MCNC: 2.9 G/DL (CALC) (ref 1.9–3.7)
GLUCOSE SERPL-MCNC: 92 MG/DL (ref 65–99)
HCT VFR BLD AUTO: 36.1 % (ref 35–45)
HDLC SERPL-MCNC: 87 MG/DL
HGB BLD-MCNC: 12 G/DL (ref 11.7–15.5)
LDLC SERPL CALC-MCNC: 113 MG/DL (CALC)
LYMPHOCYTES # BLD AUTO: 1690 CELLS/UL (ref 850–3900)
LYMPHOCYTES NFR BLD AUTO: 39.3 %
MCH RBC QN AUTO: 30.3 PG (ref 27–33)
MCHC RBC AUTO-ENTMCNC: 33.2 G/DL (ref 32–36)
MCV RBC AUTO: 91.2 FL (ref 80–100)
MONOCYTES # BLD AUTO: 503 CELLS/UL (ref 200–950)
MONOCYTES NFR BLD AUTO: 11.7 %
NEUTROPHILS # BLD AUTO: 1746 CELLS/UL (ref 1500–7800)
NEUTROPHILS NFR BLD AUTO: 40.6 %
NONHDLC SERPL-MCNC: 125 MG/DL (CALC)
PLATELET # BLD AUTO: 254 THOUSAND/UL (ref 140–400)
PMV BLD REES-ECKER: 10.8 FL (ref 7.5–12.5)
POTASSIUM SERPL-SCNC: 4.9 MMOL/L (ref 3.5–5.3)
PROT SERPL-MCNC: 7.1 G/DL (ref 6.1–8.1)
RBC # BLD AUTO: 3.96 MILLION/UL (ref 3.8–5.1)
SODIUM SERPL-SCNC: 139 MMOL/L (ref 135–146)
TRIGL SERPL-MCNC: 42 MG/DL
WBC # BLD AUTO: 4.3 THOUSAND/UL (ref 3.8–10.8)

## 2023-01-24 ENCOUNTER — OFFICE VISIT (OUTPATIENT)
Dept: FAMILY MEDICINE CLINIC | Facility: CLINIC | Age: 58
End: 2023-01-24

## 2023-01-24 VITALS
SYSTOLIC BLOOD PRESSURE: 104 MMHG | BODY MASS INDEX: 23.39 KG/M2 | WEIGHT: 137 LBS | HEART RATE: 76 BPM | HEIGHT: 64 IN | DIASTOLIC BLOOD PRESSURE: 64 MMHG

## 2023-01-24 DIAGNOSIS — Z00.00 ENCOUNTER FOR PHYSICAL EXAMINATION: Primary | ICD-10-CM

## 2023-01-24 NOTE — ASSESSMENT & PLAN NOTE
Patient doing absolutely amazing  Blood work was reviewed HDL is wonderfully elevated making her total cholesterol go up but I did explain that her LDL is below 115 so all is well and we can check on this yearly  Patient did get her first shingles vaccine due for her next in 1 month and is up-to-date with COVID flu  DEXA mammogram thyroid biopsy is all scheduled for the near future  Colonoscopy up-to-date    Patient up-to-date with eye and dental

## 2023-01-24 NOTE — PATIENT INSTRUCTIONS
1  Encounter for physical examination  Assessment & Plan:  Patient doing absolutely amazing  Blood work was reviewed HDL is wonderfully elevated making her total cholesterol go up but I did explain that her LDL is below 115 so all is well and we can check on this yearly  Patient did get her first shingles vaccine due for her next in 1 month and is up-to-date with COVID flu  DEXA mammogram thyroid biopsy is all scheduled for the near future  Colonoscopy up-to-date    Patient up-to-date with eye and dental

## 2023-01-24 NOTE — PROGRESS NOTES
ADULT ANNUAL [address],  [address] [facility] PRIMARY CARE    NAME: Akira Torres  AGE: 59 y o  SEX: female  : 1965     DATE: 2023     Assessment and Plan:     Problem List Items Addressed This Visit        Other    Encounter for physical examination - Primary       Immunizations and preventive care screenings were discussed with patient today  Appropriate education was printed on patient's after visit summary  Counseling:  Exercise: the importance of regular exercise/physical activity was discussed  Recommend exercise 3-5 times per week for at least 30 minutes  No follow-ups on file  Chief Complaint:     Chief Complaint   Patient presents with   • Physical Exam   • Results      History of Present Illness:       Akira Torres is here for chronic conditions f/u including the diagnosis of   Encounter Diagnosis   Name Primary? • Encounter for physical examination Yes      Pt  states they are taking all medications as directed without complaints or side effects   Pt  had labs done prior to today's visit which included   Recent Results (from the past 672 hour(s))   Lipid panel    Collection Time: 23  9:13 AM   Result Value Ref Range    Total Cholesterol 212 (H) <200 mg/dL    HDL 87 > OR = 50 mg/dL    Triglycerides 42 <150 mg/dL    LDL Calculated 113 (H) mg/dL (calc)    Chol HDLC Ratio 2 4 <5 0 (calc)    Non-HDL Cholesterol 125 <130 mg/dL (calc)   Comprehensive metabolic panel    Collection Time: 23  9:13 AM   Result Value Ref Range    Glucose, Random 92 65 - 99 mg/dL    BUN 15 7 - 25 mg/dL    Creatinine 0 65 0 50 - 1 03 mg/dL    eGFR 101 > OR = 60 mL/min/1 73m2    SL AMB BUN/CREATININE RATIO NOT APPLICABLE 6 - 22 (calc)    Sodium 139 135 - 146 mmol/L    Potassium 4 9 3 5 - 5 3 mmol/L    Chloride 104 98 - 110 mmol/L    CO2 31 20 - 32 mmol/L    Calcium 9 4 8 6 - 10 4 mg/dL    Protein, Total 7 1 6 1 - 8 1 g/dL    Albumin 4 2 3 6 - 5 1 g/dL Globulin 2 9 1 9 - 3 7 g/dL (calc)    Albumin/Globulin Ratio 1 4 1 0 - 2 5 (calc)    TOTAL BILIRUBIN 0 6 0 2 - 1 2 mg/dL    Alkaline Phosphatase 50 37 - 153 U/L    AST 14 10 - 35 U/L    ALT 14 6 - 29 U/L   CBC and differential    Collection Time: 01/12/23  9:13 AM   Result Value Ref Range    White Blood Cell Count 4 3 3 8 - 10 8 Thousand/uL    Red Blood Cell Count 3 96 3 80 - 5 10 Million/uL    Hemoglobin 12 0 11 7 - 15 5 g/dL    HCT 36 1 35 0 - 45 0 %    MCV 91 2 80 0 - 100 0 fL    MCH 30 3 27 0 - 33 0 pg    MCHC 33 2 32 0 - 36 0 g/dL    RDW 13 2 11 0 - 15 0 %    Platelet Count 707 663 - 400 Thousand/uL    SL AMB MPV 10 8 7 5 - 12 5 fL    Neutrophils (Absolute) 1,746 1,500 - 7,800 cells/uL    Lymphocytes (Absolute) 1,690 850 - 3,900 cells/uL    Monocytes (Absolute) 503 200 - 950 cells/uL    Eosinophils (Absolute) 323 15 - 500 cells/uL    Basophils ABS 39 0 - 200 cells/uL    Neutrophils 40 6 %    Lymphocytes 39 3 %    Monocytes 11 7 %    Eosinophils 7 5 %    Basophils PCT 0 9 %   Vitamin D 25 hydroxy    Collection Time: 01/12/23  9:13 AM   Result Value Ref Range    Vitamin D, 25-Hydroxy, Serum 37 30 - 100 ng/mL       Adult Annual Physical   Patient here for a comprehensive physical exam  The patient reports no problems  Diet and Physical Activity  Diet/Nutrition: well balanced diet  Exercise: walking  Depression Screening  PHQ-2/9 Depression Screening         General Health  Sleep: sleeps well  Hearing: normal - bilateral   Vision: most recent eye exam >1 year ago  Dental: regular dental visits  /GYN Health  Patient is: postmenopausal  Last menstrual period: age 62  Contraceptive method: in menopause  Review of Systems:     Review of Systems   Constitutional: Negative  HENT: Negative  Eyes: Negative  Respiratory: Negative  Cardiovascular: Negative  Gastrointestinal: Negative  Endocrine: Negative  Genitourinary: Negative  Musculoskeletal: Negative  Skin: Negative  Allergic/Immunologic: Negative  Neurological: Negative  Hematological: Negative  Psychiatric/Behavioral: Negative  Past Medical History:     Past Medical History:   Diagnosis Date   • Abnormal cervical Papanicolaou smear     with positive HPV DNA test; resolved 07/10/2017   • Anemia    • Depression    • Endometrial polyp    • Herniated cervical disc    • Herniated disc, cervical    • Insomnia    • Menorrhagia     D&C  today 7/26/2017   • Osteopenia    • Reflux esophagitis     occaisonal   • Thyroid nodule       Past Surgical History:     Past Surgical History:   Procedure Laterality Date   • BIOPSY CORE NEEDLE      of thyroid   • CERVICAL BIOPSY     • COLONOSCOPY N/A 3/14/2016    Procedure: COLONOSCOPY;  Surgeon: Kat Garcia MD;  Location: [facility]; Service:    • COLPOSCOPY W/ BIOPSY / CURETTAGE      colposcopy cervix with biopsy(s) with endocervical curettage   • NASAL SINUS SURGERY     • OTHER SURGICAL HISTORY Right     fatty tumor removed from arm   • OTHER SURGICAL HISTORY      Arm Incision   • AK HYSTEROSCOPY BX ENDOMETRIUM&/POLYPC W/WO D&C N/A 7/26/2017    Procedure: DILATATION AND CURETTAGE (D&C) WITH HYSTEROSCOPY POLYPECTOMY;  Surgeon: Reyes Prow, MD;  Location: [facility];  Service: Gynecology   • SINUS SURGERY      deviated septum      Social History:     Social History     Socioeconomic History   • Marital status: Legally      Spouse name: None   • Number of children: 2   • Years of education: None   • Highest education level: None   Occupational History   • None   Tobacco Use   • Smoking status: Never   • Smokeless tobacco: Never   • Tobacco comments:     pt states she qiut in 1982   Substance and Sexual Activity   • Alcohol use:  Yes     Alcohol/week: 4 0 standard drinks     Types: 4 Glasses of wine per week     Comment: per week; social alcohol use   • Drug use: No   • Sexual activity: None   Other Topics Concern   • None   Social History Narrative    Always uses seat belt Caffeine use    [employer] (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Selena Piedra)    Lives with parents     ( [facility])     Social Determinants of Health     Financial Resource Strain: Not on file   Food Insecurity: Not on file   Transportation Needs: Not on file   Physical Activity: Not on file   Stress: Not on file   Social Connections: Not on file   Intimate Partner Violence: Not on file   Housing Stability: Not on file      Family History:     Family History   Problem Relation Age of Onset   • Other Mother         cardiac disorder, cardiac pacemaker   • Stroke Mother         CVA   • Lymphoma Mother 62   • Osteoporosis Mother    • Heart failure Mother    • Pancreatic cancer Mother 80   • Asthma Father    • Breast cancer Sister 39   • Osteoarthritis Sister    • Lung cancer Maternal Grandmother 76   • Diabetes Maternal Grandfather    • Parkinsonism Maternal Grandfather    • Lymphoma Paternal Grandmother         age unknown   • Lymphoma Maternal Aunt 54   • No Known Problems Maternal Aunt       Current Medications:     Current Outpatient Medications   Medication Sig Dispense Refill   • ALPRAZolam (XANAX) 0 25 mg tablet Take 1 tablet (0 25 mg total) by mouth daily at bedtime as needed for anxiety 20 tablet 0   • Calcium Carbonate 1500 (600 Ca) MG TABS Take by mouth     • escitalopram (LEXAPRO) 10 mg tablet Take 1 tablet (10 mg total) by mouth daily 30 tablet 11   • Ginkgo Biloba (GINKGO PO) Take by mouth     • Multiple Vitamins-Minerals (MULTI COMPLETE PO) Take 1 tablet by mouth daily  • Omega-3 Fatty Acids (fish oil) 1,000 mg Take 1,000 mg by mouth daily     • Probiotic Product (PROBIOTIC DAILY PO) Take by mouth       No current facility-administered medications for this visit  Allergies:     No Known Allergies   Physical Exam:     /64   Pulse 76   Ht 5' 3 5" (1 613 m)   Wt 62 1 kg (137 lb)   BMI 23 89 kg/m²     Physical Exam  Vitals and nursing note reviewed     Constitutional:       General: She is not in acute distress  Appearance: She is well-developed  She is not diaphoretic  HENT:      Head: Normocephalic and atraumatic  Right Ear: External ear normal       Left Ear: External ear normal       Nose: Nose normal       Mouth/Throat:      Pharynx: No oropharyngeal exudate  Eyes:      General: No scleral icterus  Right eye: No discharge  Left eye: No discharge  Conjunctiva/sclera: Conjunctivae normal       Pupils: Pupils are equal, round, and reactive to light  Neck:      Thyroid: No thyromegaly  Vascular: No JVD  Trachea: No tracheal deviation  Cardiovascular:      Rate and Rhythm: Normal rate and regular rhythm  Heart sounds: Normal heart sounds  No murmur heard  No friction rub  No gallop  Pulmonary:      Effort: Pulmonary effort is normal  No respiratory distress  Breath sounds: Normal breath sounds  No stridor  No wheezing or rales  Chest:      Chest wall: No tenderness  Abdominal:      General: Bowel sounds are normal  There is no distension  Palpations: Abdomen is soft  There is no mass  Tenderness: There is no abdominal tenderness  There is no guarding or rebound  Hernia: No hernia is present  Musculoskeletal:         General: No deformity  Normal range of motion  Cervical back: Normal range of motion and neck supple  Lymphadenopathy:      Cervical: No cervical adenopathy  Skin:     General: Skin is warm and dry  Capillary Refill: Capillary refill takes more than 3 seconds  Findings: No rash  Neurological:      Mental Status: She is alert and oriented to person, place, and time  Motor: No abnormal muscle tone  Coordination: Coordination normal       Deep Tendon Reflexes: Reflexes normal    Psychiatric:         Behavior: Behavior normal          Thought Content:  Thought content normal          Judgment: Judgment normal           Alan Hutton PA-C  [facility] PRIMARY CARE

## 2023-01-25 NOTE — NURSING NOTE
Call placed to patient to discuss upcoming appointment at [facility] radiology department and complete consultation with patient  Patient is having a thyroid biopsy utilizing US guidance  Reviewed patient's allergies, no current anticoagulant medication present, also discussed the pre and post procedure expectations  Patient recalled having this procedure many years ago  Reminded patient of location and time expected for procedure, Patient expressed understanding by verbalizing and repeating instructions

## 2023-02-02 ENCOUNTER — HOSPITAL ENCOUNTER (OUTPATIENT)
Dept: RADIOLOGY | Facility: HOSPITAL | Age: 58
Discharge: HOME | End: 2023-02-02

## 2023-02-02 DIAGNOSIS — E04.2 MULTIPLE THYROID NODULES: ICD-10-CM

## 2023-02-02 RX ORDER — LIDOCAINE HYDROCHLORIDE 10 MG/ML
2 INJECTION, SOLUTION EPIDURAL; INFILTRATION; INTRACAUDAL; PERINEURAL ONCE
Status: COMPLETED | OUTPATIENT
Start: 2023-02-02 | End: 2023-02-02

## 2023-02-02 RX ADMIN — LIDOCAINE HYDROCHLORIDE 2 ML: 10 INJECTION, SOLUTION EPIDURAL; INFILTRATION; INTRACAUDAL; PERINEURAL at 10:27

## 2023-02-21 ENCOUNTER — TELEPHONE (OUTPATIENT)
Dept: FAMILY MEDICINE CLINIC | Facility: CLINIC | Age: 58
End: 2023-02-21

## 2023-02-21 NOTE — TELEPHONE ENCOUNTER
Patient called into the office in regards of her DXA scan she was refferd to get  Patient stated she called her insurance to make sure it will be 100 percent covered and was told a long as long as its coded correct and it says preventive DXA scan it would be covered  Please Advise  Thank You

## 2023-02-21 NOTE — TELEPHONE ENCOUNTER
My normal dx I attach to any DEXA order is menopause  I have never had an insurance company deny this

## 2023-02-23 ENCOUNTER — TELEPHONE (OUTPATIENT)
Dept: FAMILY MEDICINE CLINIC | Facility: CLINIC | Age: 58
End: 2023-02-23

## 2023-02-24 ENCOUNTER — HOSPITAL ENCOUNTER (OUTPATIENT)
Dept: BONE DENSITY | Facility: MEDICAL CENTER | Age: 58
Discharge: HOME | End: 2023-02-24

## 2023-02-24 ENCOUNTER — HOSPITAL ENCOUNTER (OUTPATIENT)
Dept: MAMMOGRAPHY | Facility: MEDICAL CENTER | Age: 58
Discharge: HOME | End: 2023-02-24

## 2023-02-24 VITALS — BODY MASS INDEX: 23.37 KG/M2 | HEIGHT: 64 IN | WEIGHT: 136.91 LBS

## 2023-02-24 DIAGNOSIS — Z12.31 ENCOUNTER FOR SCREENING MAMMOGRAM FOR MALIGNANT NEOPLASM OF BREAST: ICD-10-CM

## 2023-02-24 DIAGNOSIS — M85.89 OSTEOPENIA OF MULTIPLE SITES: ICD-10-CM

## 2023-02-24 NOTE — TELEPHONE ENCOUNTER
Please let the patient know that I thank her for calling because I did actually not get the results of her second part of her biopsy which was sent out to the [address] [place] [address] lab but it was scanned into her chart without my knowledge  The initial pathology run by our [facility]'s lab did show 10 to 30% possibility of malignancy however I did review the special send out Afirma which states that the biopsy is benign with a risk of malignancy if repeated at only 4%  At this time we have 1 of 2 choices we can either repeat ultrasound in 1 year or have patient have a consult with the surgical specialist to review all of her results and given expert opinion

## 2023-02-27 DIAGNOSIS — Z12.11 COLON CANCER SCREENING: ICD-10-CM

## 2023-02-27 DIAGNOSIS — Z00.00 HEALTHCARE MAINTENANCE: Primary | ICD-10-CM

## 2023-02-28 DIAGNOSIS — Z91.89 AT HIGH RISK FOR BREAST CANCER: ICD-10-CM

## 2023-02-28 DIAGNOSIS — R92.2 DENSE BREAST: Primary | ICD-10-CM

## 2023-02-28 DIAGNOSIS — M85.89 OSTEOPENIA OF MULTIPLE SITES: Primary | ICD-10-CM

## 2023-02-28 NOTE — RESULT ENCOUNTER NOTE
Please let pt know her mammo was normal  Due to her dense breast tissue and her overall radiology calculated risk of breast cancer in combination with dense breast tissue it is recommended that she go for a bilateral breast ultrasound 6 months after her mammo for additional screening  I have placed order and pt can check with her insurance company and decide if she would like to go  If so order is there for her to schedule the ABUS

## 2023-02-28 NOTE — RESULT ENCOUNTER NOTE
Please tell the patient that her DEXA scan shows that she has osteopenia which is a thinning of the bone and the stage before osteoporosis  She should increase weight-bearing exercises and avoid excessive alcohol and caffeine  One 600 mg calcium twice daily is recommended and to continue vitamin-D as directed if needed  I have placed an order for a repeat DEXA to be done in 2 years  Thank you

## 2023-03-09 ENCOUNTER — TELEPHONE (OUTPATIENT)
Dept: FAMILY MEDICINE CLINIC | Facility: CLINIC | Age: 58
End: 2023-03-09

## 2023-03-09 DIAGNOSIS — F32.A DEPRESSION, UNSPECIFIED DEPRESSION TYPE: ICD-10-CM

## 2023-03-09 DIAGNOSIS — F41.9 ANXIETY: ICD-10-CM

## 2023-03-09 NOTE — TELEPHONE ENCOUNTER
Pt called in because she needs a refill on     Medication: escitalopram 10 mg  Day supply: 30 days  Pharmacy: [facility] [address], [facility] [address]      Last office visit: 2/28/2023    Upcoming office visit: Visit date not found       Pt needs it sent to a pharmacy in [place] due to family michael    Needs this asap has only 1 pill left

## 2023-03-10 RX ORDER — ESCITALOPRAM OXALATE 10 MG/1
10 TABLET ORAL DAILY
Qty: 30 TABLET | Refills: 11 | Status: SHIPPED | OUTPATIENT
Start: 2023-03-10 | End: 2023-03-17 | Stop reason: SDUPTHER

## 2023-03-17 DIAGNOSIS — F41.9 ANXIETY: ICD-10-CM

## 2023-03-17 DIAGNOSIS — F32.A DEPRESSION, UNSPECIFIED DEPRESSION TYPE: ICD-10-CM

## 2023-03-17 RX ORDER — ALPRAZOLAM 0.25 MG/1
0.25 TABLET ORAL
Qty: 20 TABLET | Refills: 0 | Status: SHIPPED | OUTPATIENT
Start: 2023-03-17

## 2023-03-17 RX ORDER — ESCITALOPRAM OXALATE 10 MG/1
10 TABLET ORAL DAILY
Qty: 30 TABLET | Refills: 11 | Status: SHIPPED | OUTPATIENT
Start: 2023-03-17

## 2023-03-17 NOTE — TELEPHONE ENCOUNTER
Hi, my name is Jose Barber  Birthdate [id number]  I have two prescription one I just refilled  That was the Lexapro, the generic version of Lexapro  And then I have a second one which I do need a refill on  I just called over to [address] [facility] used to be home though spectrum wherever I have my I think you have it  [facility] [address] [facility]  The pharmacy there is where I have my prescription at, but they're telling me there's no refills on either of them  But I thought I just went for my physical and I thought I got refills on everything that would be able to last for a long time, for months  So they said somebody over there would have to call it in  I don't know what happened, but anyway if you could call in or send over whatever prescriptions for both of those and I only need to  the second one, the anti anxiety  So that's it  If you have any questions give me a call or you want to talk about what happened, I don't know  Mike, [phone]  That's you Huejanie Serna  Thanks so much  Happy [facility] Day   Bye, bye

## 2023-05-01 ENCOUNTER — OFFICE VISIT (OUTPATIENT)
Dept: FAMILY MEDICINE CLINIC | Facility: CLINIC | Age: 58
End: 2023-05-01

## 2023-05-01 VITALS
TEMPERATURE: 99.4 F | HEIGHT: 64 IN | HEART RATE: 68 BPM | SYSTOLIC BLOOD PRESSURE: 106 MMHG | BODY MASS INDEX: 23.22 KG/M2 | DIASTOLIC BLOOD PRESSURE: 62 MMHG | WEIGHT: 136 LBS

## 2023-05-01 DIAGNOSIS — H66.90 ACUTE OTITIS MEDIA, UNSPECIFIED OTITIS MEDIA TYPE: Primary | ICD-10-CM

## 2023-05-01 DIAGNOSIS — D17.22 LIPOMA OF LEFT UPPER EXTREMITY: ICD-10-CM

## 2023-05-01 RX ORDER — AZITHROMYCIN 250 MG/1
TABLET, FILM COATED ORAL
Qty: 6 TABLET | Refills: 0 | Status: SHIPPED | OUTPATIENT
Start: 2023-05-01 | End: 2023-05-05

## 2023-05-01 NOTE — ASSESSMENT & PLAN NOTE
Left upper arm lipoma has returned after excision many years ago refer to plastics for further evaluation and secondary excision

## 2023-05-01 NOTE — PATIENT INSTRUCTIONS
1  Acute otitis media, unspecified otitis media type  Comments:  Antibiotic as directed  Increase fluids  Continue over-the-counter cold medications  Orders:  -     azithromycin (ZITHROMAX) 250 mg tablet; Take two tablets on day one and then one tablet daily for the next four days  2  Lipoma of left upper extremity  Assessment & Plan:  Left upper arm lipoma has returned after excision many years ago refer to plastics for further evaluation and secondary excision  Orders:  -     Ambulatory Referral to Plastic Surgery;  Future

## 2023-05-01 NOTE — PROGRESS NOTES
Name: Theresa West      : 1965      MRN: [id number]  Encounter Provider: Giulia Chen PA-C  Encounter Date: 2023   Encounter department: [facility] PRIMARY CARE    Assessment & Plan     1  Acute otitis media, unspecified otitis media type  Comments:  Antibiotic as directed  Increase fluids  Continue over-the-counter cold medications  Orders:  -     azithromycin (ZITHROMAX) 250 mg tablet; Take two tablets on day one and then one tablet daily for the next four days  2  Lipoma of left upper extremity  Assessment & Plan:  Left upper arm lipoma has returned after excision many years ago refer to plastics for further evaluation and secondary excision  Orders:  -     Ambulatory Referral to Plastic Surgery; Future           Subjective      Patient states she was fine until Friday  Her significant other became sick on Thursday and that she got the same symptoms  Overall feeling not well in general aches congestion swollen glands postnasal drip sore throat ear discomfort left greater than right  Over-the-counter medications without relief  Home COVID-negative  Also patient states that many years ago she had a lump removed from her left arm and it has come back and she would like to have this out again  Review of Systems   Constitutional: Negative  HENT: Positive for ear pain and sore throat  Eyes: Negative  Respiratory: Positive for cough  Cardiovascular: Negative  Gastrointestinal: Negative  Endocrine: Negative  Genitourinary: Negative  Musculoskeletal: Positive for myalgias  Skin: Negative  Allergic/Immunologic: Negative  Neurological: Positive for dizziness  Hematological: Negative  Psychiatric/Behavioral: Negative          Current Outpatient Medications on File Prior to Visit   Medication Sig    ALPRAZolam (XANAX) 0 25 mg tablet Take 1 tablet (0 25 mg total) by mouth daily at bedtime as needed for anxiety    Calcium Carbonate 1500 (600 "Ca) MG TABS Take by mouth    escitalopram (LEXAPRO) 10 mg tablet Take 1 tablet (10 mg total) by mouth daily    Ginkgo Biloba (GINKGO PO) Take by mouth    Multiple Vitamins-Minerals (MULTI COMPLETE PO) Take 1 tablet by mouth daily   Omega-3 Fatty Acids (fish oil) 1,000 mg Take 1,000 mg by mouth daily    Probiotic Product (PROBIOTIC DAILY PO) Take by mouth       Objective     /62 (BP Location: Right arm, Patient Position: Sitting, Cuff Size: Standard)   Pulse 68   Temp 99 4 °F (37 4 °C) (Temporal)   Ht 5' 3 5\" (1 613 m) Comment: on file  Wt 61 7 kg (136 lb)   BMI 23 71 kg/m²     Physical Exam  Vitals and nursing note reviewed  Constitutional:       General: She is not in acute distress  Appearance: She is well-developed  She is not diaphoretic  HENT:      Head: Normocephalic and atraumatic  Right Ear: Hearing, ear canal and external ear normal  Tympanic membrane is injected  Left Ear: Ear canal and external ear normal       Ears:      Comments: Left TM dull hazy decreasing light reflex  Right TM with mild erythema  Eyes:      General:         Right eye: No discharge  Left eye: No discharge  Conjunctiva/sclera: Conjunctivae normal    Neck:      Vascular: No carotid bruit  Cardiovascular:      Rate and Rhythm: Normal rate and regular rhythm  Heart sounds: Normal heart sounds  No murmur heard  No friction rub  No gallop  Pulmonary:      Effort: Pulmonary effort is normal  No respiratory distress  Breath sounds: Normal breath sounds  No wheezing or rales  Musculoskeletal:      Left upper arm: Deformity present  Arms:       Cervical back: Neck supple  Back:       Comments: Patient does have a soft very mobile nontender 2 cm squishy lipoma feeling mass superficial left mid deltoid  Tender tight tense parascapular musculature on the right  Lymphadenopathy:      Cervical: Cervical adenopathy present     Skin:     General: Skin is warm and " dry    Neurological:      Mental Status: She is alert and oriented to person, place, and time     Psychiatric:         Judgment: Judgment normal        Terri Victoria PA-C

## 2023-05-15 DIAGNOSIS — F41.9 ANXIETY: ICD-10-CM

## 2023-05-15 RX ORDER — ALPRAZOLAM 0.25 MG/1
0.25 TABLET ORAL
Qty: 20 TABLET | Refills: 0 | Status: SHIPPED | OUTPATIENT
Start: 2023-05-15

## 2023-05-18 ENCOUNTER — VBI (OUTPATIENT)
Dept: ADMINISTRATIVE | Facility: OTHER | Age: 58
End: 2023-05-18

## 2023-06-22 NOTE — PROGRESS NOTES
Assessment/Plan:    Pap and HPV done today    mammogram reviewed with her including breast density  RX given for next year  She is planning to schedule the ABUS  I gave her information on this and I also reviewed with her     Discussed self breast exams    Breast, rib pain-this seems to be muscular  Her exam is normal today  It has greatly improved since she had a massage  If this recurs, and she is having pain in the nipple she will call and I will order an ultrasound  colon cancer screening-colonoscopy was in 2016  She is going to look into having a repeat this year as she is due soon  discussed preventive care, regular exercise and a healthy diet      No problem-specific Assessment & Plan notes found for this encounter  Diagnoses and all orders for this visit:    Encounter for annual routine gynecological examination    Encounter for screening mammogram for breast cancer  -     Mammo screening bilateral w 3d & cad; Future    Healthcare maintenance  -     Ambulatory Referral to Gynecology    Screening for HPV (human papillomavirus)  -     Liquid-based pap, screening    Other orders  -     cholecalciferol (VITAMIN D3) 400 units tablet; Take 400 Units by mouth daily          Subjective:      Patient ID: Carmela Cowart is a 59 y o  female  Pt here for yearly  Her last visit was in 2018  Shortly after that time, her menstrual cycles were spacing out  Her last menstrual cycle was at age 62  She had been having night sweats but they have now stopped  She has no GYN complaints  She had been having some pain near her right scapula and this was radiating around her ribs and under her right breast   Occasionally this would radiate into the right nipple  She had a massage and this seems to have improved  Normal 3D mammogram in February with dense tissue and intermediate risk    Her PCP ordered an ABUS  Sister had what sounds like DCIS, treated with lumpectomy and ?tamoxifen    Normal Pap, negative HPV in 2018, history of JW-1 in 2016  History of simple hyperplasia without atypia  This was treated with progesterone and she then had a D&C with normal endometrium and a normal polyp in 2017      The following portions of the patient's history were reviewed and updated as appropriate: allergies, current medications, past family history, past medical history, past social history, past surgical history and problem list     Review of Systems   Constitutional: Negative  Gastrointestinal: Negative  Genitourinary: Negative  Objective: There were no vitals taken for this visit  Physical Exam  Vitals reviewed  Constitutional:       Appearance: She is well-developed  Neck:      Thyroid: No thyromegaly  Trachea: No tracheal deviation  Cardiovascular:      Rate and Rhythm: Normal rate and regular rhythm  Pulmonary:      Effort: Pulmonary effort is normal       Breath sounds: Normal breath sounds  Chest:   Breasts:     Breasts are symmetrical       Right: No inverted nipple, mass, nipple discharge, skin change or tenderness  Left: No inverted nipple, mass, nipple discharge, skin change or tenderness  Abdominal:      General: There is no distension  Palpations: Abdomen is soft  There is no mass  Tenderness: There is no abdominal tenderness  Genitourinary:     Labia:         Right: No rash, tenderness, lesion or injury  Left: No rash, tenderness, lesion or injury  Vagina: Normal       Cervix: No cervical motion tenderness, discharge or friability  Adnexa:         Right: No mass, tenderness or fullness  Left: No mass, tenderness or fullness          Rectum: Normal

## 2023-06-23 ENCOUNTER — OFFICE VISIT (OUTPATIENT)
Dept: GYNECOLOGY | Facility: CLINIC | Age: 58
End: 2023-06-23
Payer: COMMERCIAL

## 2023-06-23 VITALS
BODY MASS INDEX: 23.35 KG/M2 | SYSTOLIC BLOOD PRESSURE: 108 MMHG | HEIGHT: 64 IN | WEIGHT: 136.8 LBS | DIASTOLIC BLOOD PRESSURE: 70 MMHG

## 2023-06-23 DIAGNOSIS — Z00.00 HEALTHCARE MAINTENANCE: ICD-10-CM

## 2023-06-23 DIAGNOSIS — Z11.51 SCREENING FOR HPV (HUMAN PAPILLOMAVIRUS): ICD-10-CM

## 2023-06-23 DIAGNOSIS — Z12.31 ENCOUNTER FOR SCREENING MAMMOGRAM FOR BREAST CANCER: ICD-10-CM

## 2023-06-23 DIAGNOSIS — Z01.419 ENCOUNTER FOR ANNUAL ROUTINE GYNECOLOGICAL EXAMINATION: Primary | ICD-10-CM

## 2023-06-23 PROBLEM — N85.01 SIMPLE ENDOMETRIAL HYPERPLASIA WITHOUT ATYPIA: Status: RESOLVED | Noted: 2017-07-10 | Resolved: 2023-06-23

## 2023-06-23 PROCEDURE — S0610 ANNUAL GYNECOLOGICAL EXAMINA: HCPCS | Performed by: OBSTETRICS & GYNECOLOGY

## 2023-06-23 PROCEDURE — G0476 HPV COMBO ASSAY CA SCREEN: HCPCS | Performed by: OBSTETRICS & GYNECOLOGY

## 2023-06-23 PROCEDURE — G0145 SCR C/V CYTO,THINLAYER,RESCR: HCPCS | Performed by: OBSTETRICS & GYNECOLOGY

## 2023-06-23 RX ORDER — OMEGA-3S/DHA/EPA/FISH OIL/D3 300MG-1000
400 CAPSULE ORAL DAILY
COMMUNITY

## 2023-06-27 LAB
HPV HR 12 DNA CVX QL NAA+PROBE: NEGATIVE
HPV16 DNA CVX QL NAA+PROBE: NEGATIVE
HPV18 DNA CVX QL NAA+PROBE: NEGATIVE

## 2023-06-29 LAB
LAB AP GYN PRIMARY INTERPRETATION: NORMAL
Lab: NORMAL

## 2023-06-30 PROBLEM — H66.90 ACUTE OTITIS MEDIA: Status: RESOLVED | Noted: 2023-05-01 | Resolved: 2023-06-30

## 2023-08-01 PROCEDURE — 88305 TISSUE EXAM BY PATHOLOGIST: CPT | Performed by: PATHOLOGY

## 2023-08-02 ENCOUNTER — LAB REQUISITION (OUTPATIENT)
Dept: LAB | Facility: HOSPITAL | Age: 58
End: 2023-08-02
Payer: COMMERCIAL

## 2023-08-02 DIAGNOSIS — Z12.11 ENCOUNTER FOR SCREENING FOR MALIGNANT NEOPLASM OF COLON: ICD-10-CM

## 2023-08-07 PROCEDURE — 88305 TISSUE EXAM BY PATHOLOGIST: CPT | Performed by: PATHOLOGY

## 2023-09-27 DIAGNOSIS — F41.9 ANXIETY: ICD-10-CM

## 2023-09-27 RX ORDER — ALPRAZOLAM 0.25 MG/1
0.25 TABLET ORAL
Qty: 20 TABLET | Refills: 0 | Status: SHIPPED | OUTPATIENT
Start: 2023-09-27

## 2023-09-27 NOTE — TELEPHONE ENCOUNTER
Hi this is Candace Resendiz. Date of birth is [birth year] and I'm calling to get a new prescription for. I can never pronounce these alprazolam .25 Karis MG milligrams so refill for that and I also I also would like a referral. I have a fatty tumor fatty lump on my arm that I want to get removed and I need a referral for, I guess a surgeon that could do that. I have no idea who to call [phone]. Thanks so much.

## 2023-11-14 ENCOUNTER — CONSULT (OUTPATIENT)
Dept: SURGERY | Facility: CLINIC | Age: 58
End: 2023-11-14
Payer: COMMERCIAL

## 2023-11-14 ENCOUNTER — OFFICE VISIT (OUTPATIENT)
Dept: FAMILY MEDICINE CLINIC | Facility: CLINIC | Age: 58
End: 2023-11-14
Payer: COMMERCIAL

## 2023-11-14 VITALS
OXYGEN SATURATION: 96 % | HEIGHT: 63 IN | SYSTOLIC BLOOD PRESSURE: 118 MMHG | DIASTOLIC BLOOD PRESSURE: 68 MMHG | HEART RATE: 78 BPM | WEIGHT: 140 LBS | BODY MASS INDEX: 24.8 KG/M2

## 2023-11-14 VITALS
TEMPERATURE: 97.7 F | RESPIRATION RATE: 12 BRPM | WEIGHT: 139.6 LBS | HEART RATE: 69 BPM | OXYGEN SATURATION: 98 % | SYSTOLIC BLOOD PRESSURE: 108 MMHG | BODY MASS INDEX: 24.73 KG/M2 | DIASTOLIC BLOOD PRESSURE: 68 MMHG | HEIGHT: 63 IN

## 2023-11-14 DIAGNOSIS — F90.2 ATTENTION DEFICIT HYPERACTIVITY DISORDER (ADHD), COMBINED TYPE: Primary | ICD-10-CM

## 2023-11-14 DIAGNOSIS — D17.22 LIPOMA OF LEFT UPPER EXTREMITY: Primary | ICD-10-CM

## 2023-11-14 PROCEDURE — 99214 OFFICE O/P EST MOD 30 MIN: CPT | Performed by: PHYSICIAN ASSISTANT

## 2023-11-14 PROCEDURE — 88304 TISSUE EXAM BY PATHOLOGIST: CPT | Performed by: STUDENT IN AN ORGANIZED HEALTH CARE EDUCATION/TRAINING PROGRAM

## 2023-11-14 PROCEDURE — 24071 EXC ARM/ELBOW LES SC 3 CM/>: CPT | Performed by: SURGERY

## 2023-11-14 RX ORDER — ATOMOXETINE 10 MG/1
10 CAPSULE ORAL DAILY
Qty: 30 CAPSULE | Refills: 1 | Status: SHIPPED | OUTPATIENT
Start: 2023-11-14

## 2023-11-14 NOTE — PROGRESS NOTES
Office Visit - General Surgery  Yuli Gaines MRN: [id number]  Encounter: [id number]    Assessment and Plan  Problem List Items Addressed This Visit        Other    Lipoma of left upper extremity - Primary     What appeared to be a lipoma was removed from the left upper arm. She tolerated procedure well. She was given instructions on care and activity. We will give her a call with path report when available. See her back here if needed. Relevant Orders    Tissue Exam         Chief Complaint:  Yuli Gaines is a 59 y.o. female who presents for Consult (Consult fatty tumor on left upper arm.)    Subjective  25-year-old female referred for evaluation of a lipoma left upper extremity. She had this removed many years ago and has noted it has recurred. Past Medical History:   Diagnosis Date   • Abnormal cervical Papanicolaou smear     with positive HPV DNA test; resolved 07/10/2017   • Anemia    • Depression    • Endometrial polyp    • Herniated cervical disc    • Herniated disc, cervical    • Insomnia    • Menorrhagia     D&C  today 7/26/2017   • Osteopenia    • Reflux esophagitis     occaisonal   • Simple endometrial hyperplasia without atypia 7/10/2017   • Thyroid nodule        Past Surgical History:   Procedure Laterality Date   • BIOPSY CORE NEEDLE      of thyroid   • CERVICAL BIOPSY     • COLONOSCOPY N/A 3/14/2016    Procedure: COLONOSCOPY;  Surgeon: Rebecca Miranda MD;  Location: [facility];   Service:    • COLPOSCOPY W/ BIOPSY / CURETTAGE      colposcopy cervix with biopsy(s) with endocervical curettage   • NASAL SINUS SURGERY     • OTHER SURGICAL HISTORY Right     fatty tumor removed from arm   • OTHER SURGICAL HISTORY      Arm Incision   • CT HYSTEROSCOPY BX ENDOMETRIUM&/POLYPC W/WO D&C N/A 7/26/2017    Procedure: DILATATION AND CURETTAGE (D&C) WITH HYSTEROSCOPY POLYPECTOMY;  Surgeon: David Villar MD;  Location: [facility];  Service: Gynecology   • SINUS SURGERY      deviated septum   • US GUIDED THYROID BIOPSY  2/2/2023       Family History   Problem Relation Age of Onset   • Other Mother         cardiac disorder, cardiac pacemaker   • Stroke Mother         CVA   • Lymphoma Mother 62   • Osteoporosis Mother    • Heart failure Mother    • Pancreatic cancer Mother 80   • Asthma Father    • Breast cancer Sister 39   • Osteoarthritis Sister    • Lung cancer Maternal Grandmother 76   • Diabetes Maternal Grandfather    • Parkinsonism Maternal Grandfather    • Lymphoma Paternal Grandmother         age unknown   • Lymphoma Maternal Aunt 54   • No Known Problems Maternal Aunt        Social History     Tobacco Use   • Smoking status: Never   • Smokeless tobacco: Never   • Tobacco comments:     pt states she qiut in 1982   Substance Use Topics   • Alcohol use: Yes     Alcohol/week: 4.0 standard drinks of alcohol     Types: 4 Glasses of wine per week     Comment: per week; social alcohol use   • Drug use: No        Medications  Current Outpatient Medications on File Prior to Visit   Medication Sig Dispense Refill   • ALPRAZolam (XANAX) 0.25 mg tablet Take 1 tablet (0.25 mg total) by mouth daily at bedtime as needed for anxiety 20 tablet 0   • Calcium Carbonate 1500 (600 Ca) MG TABS Take by mouth     • cholecalciferol (VITAMIN D3) 400 units tablet Take 400 Units by mouth daily     • escitalopram (LEXAPRO) 10 mg tablet Take 1 tablet (10 mg total) by mouth daily 30 tablet 11   • Ginkgo Biloba (GINKGO PO) Take by mouth     • Multiple Vitamins-Minerals (MULTI COMPLETE PO) Take 1 tablet by mouth daily. • Omega-3 Fatty Acids (fish oil) 1,000 mg Take 1,000 mg by mouth daily     • Probiotic Product (PROBIOTIC DAILY PO) Take by mouth       No current facility-administered medications on file prior to visit.        Allergies  No Known Allergies    Review of Systems    Objective  Vitals:    11/14/23 1046   BP: 108/68   Pulse: 69   Resp: 12   Temp: 97.7 °F (36.5 °C)   SpO2: 98%       Physical Exam  Left upper arm is an old well-healed incision and this slightly lateral to this is a 2 cm firm subcutaneous mass    Procedures  After permission, the left upper arm region was prepped and draped in usual fashion. Timeout taken. Local anesthesia 1% lidocaine with epi was infiltrated into the skin and subcutaneous tissue and deeper layers of tissue. Incision made through the skin with a knife down to a fatty tumor. This was sharply and bluntly dissected free with the scissors. The size of this was about 4 x 3 x 1 cm. The subcutaneous tissue was approximated with 4-0 Monocryl. Skin closed with subcuticular 4-0 Monocryl. Band-Aid dressing applied. Tolerated procedure well.

## 2023-11-14 NOTE — ASSESSMENT & PLAN NOTE
What appeared to be a lipoma was removed from the left upper arm. She tolerated procedure well. She was given instructions on care and activity. We will give her a call with path report when available. See her back here if needed.

## 2023-11-15 NOTE — PROGRESS NOTES
Name: Carmina Chowdhury      : 1965      MRN: [id number]  Encounter Provider: Rasheed Barr PA-C  Encounter Date: 2023   Encounter department: [facility]    Assessment & Plan     1. Attention deficit hyperactivity disorder (ADHD), combined type  Assessment & Plan:  Patient had positive screenings on questionnaires and was diagnosed through a licensed counselor with this diagnosis. Patient has been dealing with this her whole life but would like to try something for it and I would like to start her on Strattera 10 mg once daily recheck 4 to 6 weeks. May need up titration but will wait. Next appointment may be virtual.    Orders:  -     atoMOXetine (STRATTERA) 10 MG capsule; Take 1 capsule (10 mg total) by mouth daily           Subjective      Patient is here today to initiate nonstimulant ADD medication as discussed through patient messages. She did get a accurate evaluation and diagnosis by a counselor already. She states that she has been struggling with this her entire life and is now ready to possibly try something that may change how unorganized procrastinating and hopefully change her life. 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 Her symptoms impact her daily life. Son also has diagnosis of ADD. "To Whom it May Concern,  Lourdes Taylor was diagnosed with Attention Deficit Hyperactivity Disorder, inattentive type (ICD Code: F90.0) by  Krzysztof Colindres LPC on 10/18/2023. This diagnosis was made using patient reports, clinical interview and  observation, diagnostic criteria and standardized questionnaires including the ASRS. Although the evaluation focused on ADHD, we also screened the patient for Major Depressive Disorder and  Generalized Anxiety Disorder. The patient did not meet criteria for either disorder.   The client was encouraged to seek ongoing treatment including therapy and medication. Sincerely,  Martha Arzate LPC, [place], CCTP  Signed by Martha Arzate  Wednesday, October 18, at 3:11 PM"      Review of Systems   Constitutional: Negative. HENT: Negative. Eyes: Negative. Respiratory: Negative. Cardiovascular: Negative. Gastrointestinal: Negative. Endocrine: Negative. Genitourinary: Negative. Musculoskeletal: Negative. Skin: Negative. Allergic/Immunologic: Negative. Neurological: Negative. Hematological: Negative. Psychiatric/Behavioral: Negative. Current Outpatient Medications on File Prior to Visit   Medication Sig   • ALPRAZolam (XANAX) 0.25 mg tablet Take 1 tablet (0.25 mg total) by mouth daily at bedtime as needed for anxiety   • Calcium Carbonate 1500 (600 Ca) MG TABS Take by mouth   • cholecalciferol (VITAMIN D3) 400 units tablet Take 400 Units by mouth daily   • escitalopram (LEXAPRO) 10 mg tablet Take 1 tablet (10 mg total) by mouth daily   • Ginkgo Biloba (GINKGO PO) Take by mouth   • Multiple Vitamins-Minerals (MULTI COMPLETE PO) Take 1 tablet by mouth daily. • Omega-3 Fatty Acids (fish oil) 1,000 mg Take 1,000 mg by mouth daily   • Probiotic Product (PROBIOTIC DAILY PO) Take by mouth       Objective     /68 (BP Location: Right arm, Patient Position: Sitting, Cuff Size: Standard)   Pulse 78   Ht 5' 3" (1.6 m)   Wt 63.5 kg (140 lb)   SpO2 96%   BMI 24.80 kg/m²     Physical Exam  Vitals and nursing note reviewed. Constitutional:       General: She is not in acute distress. Appearance: She is well-developed. She is not diaphoretic. HENT:      Head: Normocephalic and atraumatic. Nose: Nose normal.   Eyes:      General:         Right eye: No discharge. Left eye: No discharge. Conjunctiva/sclera: Conjunctivae normal.   Neck:      Vascular: No carotid bruit. Cardiovascular:      Rate and Rhythm: Normal rate and regular rhythm. Heart sounds: Normal heart sounds. No murmur heard. No friction rub. No gallop. Pulmonary:      Effort: Pulmonary effort is normal. No respiratory distress. Breath sounds: Normal breath sounds. No wheezing or rales. Musculoskeletal:      Cervical back: Neck supple. Skin:     General: Skin is warm and dry. Neurological:      Mental Status: She is alert and oriented to person, place, and time.    Psychiatric:         Judgment: Judgment normal.       Chiqui Stark PA-C

## 2023-11-15 NOTE — ASSESSMENT & PLAN NOTE
Patient had positive screenings on questionnaires and was diagnosed through a licensed counselor with this diagnosis. Patient has been dealing with this her whole life but would like to try something for it and I would like to start her on Strattera 10 mg once daily recheck 4 to 6 weeks. May need up titration but will wait.   Next appointment may be virtual.

## 2023-11-21 PROCEDURE — 88304 TISSUE EXAM BY PATHOLOGIST: CPT | Performed by: STUDENT IN AN ORGANIZED HEALTH CARE EDUCATION/TRAINING PROGRAM

## 2023-11-27 DIAGNOSIS — F41.9 ANXIETY: ICD-10-CM

## 2023-11-29 ENCOUNTER — TELEPHONE (OUTPATIENT)
Dept: FAMILY MEDICINE CLINIC | Facility: CLINIC | Age: 58
End: 2023-11-29

## 2023-11-29 NOTE — TELEPHONE ENCOUNTER
----- Message from Anali Lagunas sent at 11/29/2023  1:14 PM EST -----  Regarding: ADD MEDS  Contact: [phone]  I've been having some trouble sleeping since I started with the ADD meds.  Will that subside on its own?    If not, I'll take 1/2 a Xanax before bed. That takes the edge off on nights I can't turn off my brain.      I'm hoping it will subside on its own though.  I'd prefer not to take the Xanax every night.                             Have a good afternoon,                                                  Anali

## 2023-11-30 RX ORDER — ALPRAZOLAM 0.25 MG/1
0.25 TABLET ORAL
Qty: 20 TABLET | Refills: 0 | Status: SHIPPED | OUTPATIENT
Start: 2023-11-30

## 2023-12-07 ENCOUNTER — HOSPITAL ENCOUNTER (OUTPATIENT)
Dept: ULTRASOUND IMAGING | Facility: CLINIC | Age: 58
Discharge: HOME | End: 2023-12-07
Payer: COMMERCIAL

## 2023-12-07 VITALS — WEIGHT: 140 LBS | BODY MASS INDEX: 24.8 KG/M2 | HEIGHT: 63 IN

## 2023-12-07 DIAGNOSIS — R92.30 DENSE BREAST: ICD-10-CM

## 2023-12-07 DIAGNOSIS — Z91.89 AT HIGH RISK FOR BREAST CANCER: ICD-10-CM

## 2023-12-07 DIAGNOSIS — R92.2 DENSE BREAST: ICD-10-CM

## 2023-12-07 PROCEDURE — 76641 ULTRASOUND BREAST COMPLETE: CPT

## 2023-12-09 NOTE — MISCELLANEOUS
Message   Recorded as Task   Date: 03/06/2016 07:48 AM, Created By: Cherelle Cherry   Task Name: Go to Result   Assigned To: [facility]   Regarding Patient: Francisco Javier Gabriel, Status: Active   Comment:    Cherelle Cehrry - 06 Mar 2016 7:48 AM     TASK CREATED  normal RPR and HIV        Active Problems    1  Anemia (285 9) (D64 9)   2  Depression (311) (F32 9)   3  Encounter for screening for malignant neoplasm of cervix (V76 2) (Z12 4)   4  Encounter for screening for malignant neoplasm of colon (V76 51) (Z12 11)   5  Encounter for screening mammogram for malignant neoplasm of breast (V76 12)   (Z12 31)   6  Insomnia (780 52) (G47 00)   7  Multiple thyroid nodules (241 1) (E04 2)   8  Need for immunization against influenza (V04 81) (Z23)   9  Osteopenia (733 90) (M85 80)   10  Pap smear, as part of routine gynecological examination (V76 2) (Z01 419)   11  Screening examination for STD (sexually transmitted disease) (V74 5) (Z11 3)   12  Screening for HPV (human papillomavirus) (V73 81) (Z11 51)   13  Screening for STDs (sexually transmitted diseases) (V74 5) (Z11 3)   14  Well woman exam with routine gynecological exam (V72 31) (Z01 419)    Current Meds   1  Collagen CAPS Recorded   2  Effexor XR 75 MG Oral Capsule Extended Release 24 Hour (Venlafaxine HCl ER); take 1   capsule by mouth once daily; Therapy: [id number] to Recorded   3  Iron Supplement 325 (65 Fe) MG Oral Tablet; Therapy: [id number] to Recorded   4  Multivitamins Oral Capsule; Therapy: 59HJZ0470 to Recorded   5  Omega-3-acid Ethyl Esters 1 GM Oral Capsule Recorded    Allergies    1   No Known Drug Allergies    Signatures   Electronically signed by : Amrit Grover, ; Mar  7 2016 11:34AM EST                       (Author) No Contractions

## 2023-12-13 ENCOUNTER — TELEMEDICINE (OUTPATIENT)
Dept: FAMILY MEDICINE CLINIC | Facility: CLINIC | Age: 58
End: 2023-12-13
Payer: COMMERCIAL

## 2023-12-13 DIAGNOSIS — F90.2 ATTENTION DEFICIT HYPERACTIVITY DISORDER (ADHD), COMBINED TYPE: Primary | ICD-10-CM

## 2023-12-13 PROCEDURE — 99213 OFFICE O/P EST LOW 20 MIN: CPT | Performed by: PHYSICIAN ASSISTANT

## 2023-12-13 RX ORDER — ATOMOXETINE 18 MG/1
18 CAPSULE ORAL DAILY
Qty: 30 CAPSULE | Refills: 1 | Status: SHIPPED | OUTPATIENT
Start: 2023-12-13

## 2023-12-13 RX ORDER — TRETINOIN 0.5 MG/G
CREAM TOPICAL
COMMUNITY
Start: 2023-11-20

## 2023-12-13 NOTE — ASSESSMENT & PLAN NOTE
Patient was unable to connect to video for virtual follow-up to initiation of Strattera 10 mg. Patient is stating that she is not really noticing too much of a difference in increase of concentration and focus within work or home life. She is tolerating it well after having some initial issues with trouble falling asleep. At this point in time we are in agreement to increase from 10 mg to 18 mg and will recheck in 6 weeks.

## 2023-12-13 NOTE — PROGRESS NOTES
Virtual Brief Visit    This Visit is being completed by telephone. The Patient is located at Home and in the following state in which I hold an active license [place]    The patient was identified by name and date of birth. Hillary Emery was informed that this is a telemedicine visit and that the visit is being conducted through Telephone. My office door was closed. No one else was in the room. She acknowledged consent and understanding of privacy and security of the video platform. The patient has agreed to participate and understands they can discontinue the visit at any time. Patient is aware this is a billable service. Assessment/Plan:    Problem List Items Addressed This Visit        Other    Attention deficit hyperactivity disorder (ADHD), combined type - Primary     Patient was unable to connect to video for virtual follow-up to initiation of Strattera 10 mg. Patient is stating that she is not really noticing too much of a difference in increase of concentration and focus within work or home life. She is tolerating it well after having some initial issues with trouble falling asleep. At this point in time we are in agreement to increase from 10 mg to 18 mg and will recheck in 6 weeks. Relevant Medications    atomoxetine (STRATTERA) 18 mg capsule       Recent Visits  No visits were found meeting these conditions. Showing recent visits within past 7 days and meeting all other requirements  Today's Visits  Date Type Provider Dept   12/13/23 1405 [facility] CHACHO Vela Pg [facility]   Showing today's visits and meeting all other requirements  Future Appointments  No visits were found meeting these conditions.   Showing future appointments within next 150 days and meeting all other requirements         Visit Time  Total Visit Duration: 20

## 2023-12-13 NOTE — PROGRESS NOTES
Virtual Regular Visit    Verification of patient location:    Patient is located at {[facility] Virtual Patient Location:[address]} in the following state in which I hold an active license {[facility] virtual patient location:[address]}      Assessment/Plan:    Problem List Items Addressed This Visit        Other    Attention deficit hyperactivity disorder (ADHD), combined type - Primary    Relevant Medications    atomoxetine (STRATTERA) 18 mg capsule            Reason for visit is   Chief Complaint   Patient presents with   • Follow-up     1 month f/u on medication for ADHD , states medication is working " a little" does notice a difference. • Virtual Regular Visit          Encounter provider Karen Hdz PA-C    Provider located at [address] [address] [phone]      Recent Visits  No visits were found meeting these conditions. Showing recent visits within past 7 days and meeting all other requirements  Today's Visits  Date Type Provider Dept   12/13/23 1405 [facility] CHACHO Vela Pg [facility]   Showing today's visits and meeting all other requirements  Future Appointments  No visits were found meeting these conditions. Showing future appointments within next 150 days and meeting all other requirements       The patient was identified by name and date of birth. Roosevelt Pettit was informed that this is a telemedicine visit and that the visit is being conducted through {[facility] VIRTUAL VISIT ENLNUY:68483}. {Telemedicine confidentiality :34491} {Telemedicine participants:78725}  She acknowledged consent and understanding of privacy and security of the video platform. The patient has agreed to participate and understands they can discontinue the visit at any time. Patient is aware this is a billable service. Subjective  Roosevelt Pettit is a 59 y.o. female *** .       HPI     Past Medical History:   Diagnosis Date   • Abnormal cervical Papanicolaou smear     with positive HPV DNA test; resolved 07/10/2017   • Anemia    • Depression    • Endometrial polyp    • Herniated cervical disc    • Herniated disc, cervical    • Insomnia    • Menorrhagia     D&C  today 7/26/2017   • Osteopenia    • Reflux esophagitis     occaisonal   • Simple endometrial hyperplasia without atypia 7/10/2017   • Thyroid nodule        Past Surgical History:   Procedure Laterality Date   • BIOPSY CORE NEEDLE      of thyroid   • CERVICAL BIOPSY     • COLONOSCOPY N/A 3/14/2016    Procedure: COLONOSCOPY;  Surgeon: Mariah Mehta MD;  Location: [facility]; Service:    • COLPOSCOPY W/ BIOPSY / CURETTAGE      colposcopy cervix with biopsy(s) with endocervical curettage   • NASAL SINUS SURGERY     • OTHER SURGICAL HISTORY Right     fatty tumor removed from arm   • OTHER SURGICAL HISTORY      Arm Incision   • OR HYSTEROSCOPY BX ENDOMETRIUM&/POLYPC W/WO D&C N/A 7/26/2017    Procedure: DILATATION AND CURETTAGE (D&C) WITH HYSTEROSCOPY POLYPECTOMY;  Surgeon: Jil Traore MD;  Location: [facility];  Service: Gynecology   • SINUS SURGERY      deviated septum   • US GUIDED THYROID BIOPSY  2/2/2023       Current Outpatient Medications   Medication Sig Dispense Refill   • ALPRAZolam (XANAX) 0.25 mg tablet Take 1 tablet (0.25 mg total) by mouth daily at bedtime as needed for anxiety 20 tablet 0   • atomoxetine (STRATTERA) 18 mg capsule Take 1 capsule (18 mg total) by mouth daily 30 capsule 1   • Calcium Carbonate 1500 (600 Ca) MG TABS Take by mouth     • cholecalciferol (VITAMIN D3) 400 units tablet Take 400 Units by mouth daily     • escitalopram (LEXAPRO) 10 mg tablet Take 1 tablet (10 mg total) by mouth daily 30 tablet 11   • Ginkgo Biloba (GINKGO PO) Take by mouth     • Multiple Vitamins-Minerals (MULTI COMPLETE PO) Take 1 tablet by mouth daily.      • Omega-3 Fatty Acids (fish oil) 1,000 mg Take 1,000 mg by mouth daily     • Probiotic Product (PROBIOTIC DAILY PO) Take by mouth     • tretinoin (REFISSA) 0.05 % cream APPLY PEA SIZED AMOUNT TO FACE AFTER WASHING AND DRYING AT NIGHT . ..  (REFER TO PRESCRIPTION NOTES). (Patient not taking: Reported on 12/13/2023)       No current facility-administered medications for this visit. No Known Allergies    Review of Systems    Video Exam    There were no vitals filed for this visit.     Physical Exam     Visit Time  Total Visit Duration: ***

## 2023-12-13 NOTE — PATIENT INSTRUCTIONS
Problem List Items Addressed This Visit          Other    Attention deficit hyperactivity disorder (ADHD), combined type - Primary     Patient was unable to connect to video for virtual follow-up to initiation of Strattera 10 mg. Patient is stating that she is not really noticing too much of a difference in increase of concentration and focus within work or home life. She is tolerating it well after having some initial issues with trouble falling asleep. At this point in time we are in agreement to increase from 10 mg to 18 mg and will recheck in 6 weeks.          Relevant Medications    atomoxetine (STRATTERA) 18 mg capsule

## 2024-01-06 DIAGNOSIS — F41.9 ANXIETY: ICD-10-CM

## 2024-01-06 DIAGNOSIS — F32.A DEPRESSION, UNSPECIFIED DEPRESSION TYPE: ICD-10-CM

## 2024-01-08 RX ORDER — ESCITALOPRAM OXALATE 10 MG/1
10 TABLET ORAL DAILY
Qty: 30 TABLET | Refills: 0 | Status: SHIPPED | OUTPATIENT
Start: 2024-01-08

## 2024-01-10 DIAGNOSIS — F90.2 ATTENTION DEFICIT HYPERACTIVITY DISORDER (ADHD), COMBINED TYPE: ICD-10-CM

## 2024-01-10 RX ORDER — ATOMOXETINE 18 MG/1
18 CAPSULE ORAL DAILY
Qty: 30 CAPSULE | Refills: 0 | Status: SHIPPED | OUTPATIENT
Start: 2024-01-10

## 2024-02-09 DIAGNOSIS — F32.A DEPRESSION, UNSPECIFIED DEPRESSION TYPE: ICD-10-CM

## 2024-02-09 DIAGNOSIS — F90.2 ATTENTION DEFICIT HYPERACTIVITY DISORDER (ADHD), COMBINED TYPE: ICD-10-CM

## 2024-02-09 DIAGNOSIS — F41.9 ANXIETY: ICD-10-CM

## 2024-02-09 RX ORDER — ATOMOXETINE 18 MG/1
18 CAPSULE ORAL DAILY
Qty: 30 CAPSULE | Refills: 0 | Status: SHIPPED | OUTPATIENT
Start: 2024-02-09

## 2024-02-09 RX ORDER — ESCITALOPRAM OXALATE 10 MG/1
10 TABLET ORAL DAILY
Qty: 30 TABLET | Refills: 5 | Status: SHIPPED | OUTPATIENT
Start: 2024-02-09

## 2024-02-21 PROBLEM — Z00.00 HEALTHCARE MAINTENANCE: Status: RESOLVED | Noted: 2020-01-29 | Resolved: 2024-02-21

## 2024-02-27 ENCOUNTER — HOSPITAL ENCOUNTER (OUTPATIENT)
Dept: RADIOLOGY | Age: 59
Discharge: HOME | End: 2024-02-27
Payer: COMMERCIAL

## 2024-02-27 DIAGNOSIS — Z12.31 ENCOUNTER FOR SCREENING MAMMOGRAM FOR BREAST CANCER: ICD-10-CM

## 2024-02-27 PROCEDURE — 77067 SCR MAMMO BI INCL CAD: CPT

## 2024-02-27 PROCEDURE — 77063 BREAST TOMOSYNTHESIS BI: CPT

## 2024-03-11 NOTE — MISCELLANEOUS
Message   Date: 31 May 2017 11:12 AM EST, Recorded By: Gonzalo Funes For: Stiven Bi: Citlaly Jaquez, Self   Phone: [phone] (Home)   Reason: Medical Complaint   Patient called c/o LMP 5/17/17 and still bleeding, 1 tampon q 2 hours    Wants appointment  Active Problems    1  Abnormal cervical Pap smear with positive HPV DNA test (795 09,079 4)   2  Anemia (285 9) (D64 9)   3  Depression (311) (F32 9)   4  Encounter for screening mammogram for breast cancer (V76 12) (Z12 31)   5  Multiple thyroid nodules (241 1) (E04 2)   6  Osteopenia (733 90) (M85 80)   7  Pap smear abnormality of cervix with LGSIL (795 03) (T8 771)    Current Meds   1  Collagen CAPS Recorded   2  Iron Supplement 325 (65 Fe) MG TABS; Therapy: [id number] to Recorded   3  Multivitamins Oral Capsule; Therapy: [id number] to Recorded   4  Omega-3-acid Ethyl Esters 1 GM Oral Capsule Recorded   5  Progesterone CREA; Therapy: (Recorded:03Prv7657) to Recorded    Allergies    1   No Known Drug Allergies    Signatures   Electronically signed by : Jsoe Acuna, ; May 31 2017 11:17AM EST                       (Author)
Name band;

## 2024-03-19 ENCOUNTER — TELEPHONE (OUTPATIENT)
Age: 59
End: 2024-03-19

## 2024-03-19 NOTE — TELEPHONE ENCOUNTER
Patient states that she wants to stop taking atomoxetine 18 mg she feels that the medication is not doing anything for her and wants to confirm that there is no change when she stops taking the medication but would like to confirm that she will not have any major side effects. Please advise.

## 2024-03-19 NOTE — TELEPHONE ENCOUNTER
"She should be fine to just stop the med. There usually is no reason to wean. May feel a little \"weird\" for a few days but nothing big. Thank you.   "

## 2024-05-07 DIAGNOSIS — F41.9 ANXIETY: ICD-10-CM

## 2024-05-07 RX ORDER — ALPRAZOLAM 0.25 MG/1
0.25 TABLET ORAL
Qty: 20 TABLET | Refills: 0 | Status: SHIPPED | OUTPATIENT
Start: 2024-05-07

## 2024-08-23 DIAGNOSIS — F32.A DEPRESSION, UNSPECIFIED DEPRESSION TYPE: ICD-10-CM

## 2024-08-23 DIAGNOSIS — F41.9 ANXIETY: ICD-10-CM

## 2024-08-23 RX ORDER — ESCITALOPRAM OXALATE 10 MG/1
10 TABLET ORAL DAILY
Qty: 30 TABLET | Refills: 5 | Status: SHIPPED | OUTPATIENT
Start: 2024-08-23

## 2024-08-23 RX ORDER — ALPRAZOLAM 0.25 MG
0.25 TABLET ORAL
Qty: 20 TABLET | Refills: 0 | Status: SHIPPED | OUTPATIENT
Start: 2024-08-23

## 2024-08-23 NOTE — TELEPHONE ENCOUNTER
Reason for call:   [x] Refill   [] Prior Auth  [] Other:     Office:   [x] PCP/Provider - Lillie Pretty PA-C / Ericka RAMIREZ  [] Specialty/Provider -         Pharmacy:  [facility] - [place] [facility]     Does the patient have enough for 3 days?   [] Yes   [x] No - Send as HP to POD

## 2024-11-25 ENCOUNTER — TELEPHONE (OUTPATIENT)
Age: 59
End: 2024-11-25

## 2024-11-25 DIAGNOSIS — F32.A DEPRESSION, UNSPECIFIED DEPRESSION TYPE: ICD-10-CM

## 2024-11-25 DIAGNOSIS — Z13.220 LIPID SCREENING: ICD-10-CM

## 2024-11-25 DIAGNOSIS — E04.2 MULTIPLE THYROID NODULES: Primary | ICD-10-CM

## 2024-11-25 DIAGNOSIS — M79.10 MYALGIA: ICD-10-CM

## 2024-11-25 DIAGNOSIS — F41.9 ANXIETY: ICD-10-CM

## 2024-11-25 NOTE — TELEPHONE ENCOUNTER
Reason for call:   [x] Refill   [] Prior Auth  [] Other:     Office:   [x] PCP/Provider - PRIMARY CARE [facility] POD  Authorized By: Lillie Pretty PA-C  [] Specialty/Provider -     Medication: ALPRAZolam (XANAX) 0.25 mg tablet     Dose/Frequency: Take 1 tablet (0.25 mg total) by mouth daily at bedtime as needed for anxiety,     Quantity: 20 tablet     Pharmacy: [facility] - [place], [place] - [address]     Does the patient have enough for 3 days?   [x] Yes   [] No - Send as HP to POD

## 2024-11-26 RX ORDER — ALPRAZOLAM 0.25 MG/1
0.25 TABLET ORAL
Qty: 20 TABLET | Refills: 5 | Status: SHIPPED | OUTPATIENT
Start: 2024-11-26

## 2025-01-07 ENCOUNTER — OFFICE VISIT (OUTPATIENT)
Dept: FAMILY MEDICINE CLINIC | Facility: CLINIC | Age: 60
End: 2025-01-07
Payer: COMMERCIAL

## 2025-01-07 VITALS
SYSTOLIC BLOOD PRESSURE: 100 MMHG | WEIGHT: 138 LBS | DIASTOLIC BLOOD PRESSURE: 60 MMHG | HEIGHT: 63 IN | HEART RATE: 78 BPM | BODY MASS INDEX: 24.45 KG/M2 | OXYGEN SATURATION: 98 %

## 2025-01-07 DIAGNOSIS — M85.89 OSTEOPENIA OF MULTIPLE SITES: ICD-10-CM

## 2025-01-07 DIAGNOSIS — F32.A DEPRESSION, UNSPECIFIED DEPRESSION TYPE: ICD-10-CM

## 2025-01-07 DIAGNOSIS — E04.2 MULTIPLE THYROID NODULES: Primary | ICD-10-CM

## 2025-01-07 DIAGNOSIS — F41.9 ANXIETY: ICD-10-CM

## 2025-01-07 DIAGNOSIS — Z00.00 ENCOUNTER FOR PHYSICAL EXAMINATION: ICD-10-CM

## 2025-01-07 PROBLEM — Z86.16 HISTORY OF COVID-19: Status: RESOLVED | Noted: 2023-01-04 | Resolved: 2025-01-07

## 2025-01-07 PROCEDURE — 99396 PREV VISIT EST AGE 40-64: CPT | Performed by: PHYSICIAN ASSISTANT

## 2025-01-07 PROCEDURE — 99214 OFFICE O/P EST MOD 30 MIN: CPT | Performed by: PHYSICIAN ASSISTANT

## 2025-01-07 RX ORDER — ESCITALOPRAM OXALATE 10 MG/1
10 TABLET ORAL DAILY
Qty: 90 TABLET | Refills: 3 | Status: SHIPPED | OUTPATIENT
Start: 2025-01-07

## 2025-01-07 NOTE — ASSESSMENT & PLAN NOTE
Very well-controlled on Lexapro 10 no SI or HI refills given for a year.  Orders:  •  escitalopram (LEXAPRO) 10 mg tablet; Take 1 tablet (10 mg total) by mouth daily

## 2025-01-07 NOTE — PATIENT INSTRUCTIONS
1. Multiple thyroid nodules  Assessment & Plan:  Patient did have thyroid nodule biopsy which was benign in 2/2023.  At this time would repeat thyroid ultrasound.  Orders:    US thyroid; Future    Orders:  -     US thyroid; Future; Expected date: 01/07/2025  2. Osteopenia of multiple sites  Assessment & Plan:         3. Depression, unspecified depression type  Assessment & Plan:    Orders:    escitalopram (LEXAPRO) 10 mg tablet; Take 1 tablet (10 mg total) by mouth daily    Orders:  -     escitalopram (LEXAPRO) 10 mg tablet; Take 1 tablet (10 mg total) by mouth daily  4. Anxiety  Assessment & Plan:    Orders:    escitalopram (LEXAPRO) 10 mg tablet; Take 1 tablet (10 mg total) by mouth daily    Orders:  -     escitalopram (LEXAPRO) 10 mg tablet; Take 1 tablet (10 mg total) by mouth daily  5. Encounter for physical examination  Assessment & Plan:

## 2025-01-07 NOTE — ASSESSMENT & PLAN NOTE
Check fasting blood work patient is declining and Adacel today up-to-date with shingles.  Prevnar age 63.  Mammogram is scheduled colonoscopy is up-to-date.

## 2025-01-07 NOTE — ASSESSMENT & PLAN NOTE
Patient did have thyroid nodule biopsy which was benign in 2/2023.  At this time would repeat thyroid ultrasound.  Orders:  •  US thyroid; Future

## 2025-01-07 NOTE — PROGRESS NOTES
Adult Annual Physical  Name: Anali Lagunas      : 1965      MRN: [id number]  Encounter Provider: Lillie Pretty PA-C  Encounter Date: 2025   Encounter department: [facility]    Assessment & Plan  Multiple thyroid nodules  Patient did have thyroid nodule biopsy which was benign in 2023.  At this time would repeat thyroid ultrasound.  Orders:  •  US thyroid; Future    Osteopenia of multiple sites  Patient is due for DEXA in February order reprinted given to the patient call with results.       Depression, unspecified depression type  Very well-controlled on Lexapro 10 no SI or HI refills given for a year.  Orders:  •  escitalopram (LEXAPRO) 10 mg tablet; Take 1 tablet (10 mg total) by mouth daily    Anxiety  Patient currently only needing her Xanax 1-2 times a week as her anxiety has markedly improved situationally.  Orders:  •  escitalopram (LEXAPRO) 10 mg tablet; Take 1 tablet (10 mg total) by mouth daily    Encounter for physical examination  Check fasting blood work patient is declining and Adacel today up-to-date with shingles.  Prevnar age 63.  Mammogram is scheduled colonoscopy is up-to-date.       Immunizations and preventive care screenings were discussed with patient today. Appropriate education was printed on patient's after visit summary.    Counseling:  Exercise: the importance of regular exercise/physical activity was discussed. Recommend exercise 3-5 times per week for at least 30 minutes.       Depression Screening and Follow-up Plan: Patient was screened for depression during today's encounter. They screened negative with a PHQ-9 score of 1.        History of Present Illness     Adult Annual Physical:  Patient presents for annual physical.     Diet and Physical Activity:  - Diet/Nutrition: well balanced diet.  - Exercise: no formal exercise.    Depression Screening:    - PHQ-9 Score: 1    General Health:  - Sleep: sleeps well.  - Hearing: normal hearing bilateral  "ears and tinnitus.  - Vision: wears glasses and goes for regular eye exams.  - Dental: regular dental visits.    /GYN Health:  - Follows with GYN: yes.   - Menopause: postmenopausal.     Advanced Care Planning:  - Has an advanced directive?: no    - Has a durable medical POA?: no    - ACP document given to patient?: yes      Review of Systems   Constitutional: Negative.    HENT: Negative.     Eyes: Negative.    Respiratory: Negative.     Cardiovascular: Negative.    Gastrointestinal: Negative.    Endocrine: Negative.    Genitourinary: Negative.    Musculoskeletal: Negative.    Skin: Negative.    Allergic/Immunologic: Negative.    Neurological: Negative.    Hematological: Negative.    Psychiatric/Behavioral: Negative.           Objective   /60   Pulse 78   Ht 5' 3.25\" (1.607 m)   Wt 62.6 kg (138 lb)   SpO2 98%   BMI 24.25 kg/m²     Physical Exam  Vitals and nursing note reviewed.   Constitutional:       General: She is not in acute distress.     Appearance: She is well-developed. She is not diaphoretic.   HENT:      Head: Normocephalic and atraumatic.      Right Ear: External ear normal.      Left Ear: External ear normal.      Nose: Nose normal.      Mouth/Throat:      Pharynx: No oropharyngeal exudate.   Eyes:      General: No scleral icterus.        Right eye: No discharge.         Left eye: No discharge.      Conjunctiva/sclera: Conjunctivae normal.      Pupils: Pupils are equal, round, and reactive to light.   Neck:      Thyroid: No thyromegaly.      Vascular: No JVD.      Trachea: No tracheal deviation.   Cardiovascular:      Rate and Rhythm: Normal rate and regular rhythm.      Heart sounds: Normal heart sounds. No murmur heard.     No friction rub. No gallop.   Pulmonary:      Effort: Pulmonary effort is normal. No respiratory distress.      Breath sounds: Normal breath sounds. No stridor. No wheezing or rales.   Chest:      Chest wall: No tenderness.   Abdominal:      General: Bowel sounds are " normal. There is no distension.      Palpations: Abdomen is soft. There is no mass.      Tenderness: There is no abdominal tenderness. There is no guarding or rebound.      Hernia: No hernia is present.   Musculoskeletal:         General: No deformity. Normal range of motion.      Cervical back: Normal range of motion and neck supple.   Lymphadenopathy:      Cervical: No cervical adenopathy.   Skin:     General: Skin is warm and dry.      Capillary Refill: Capillary refill takes more than 3 seconds.      Findings: No rash.   Neurological:      Mental Status: She is alert and oriented to person, place, and time.      Motor: No abnormal muscle tone.      Coordination: Coordination normal.      Deep Tendon Reflexes: Reflexes normal.   Psychiatric:         Behavior: Behavior normal.         Thought Content: Thought content normal.         Judgment: Judgment normal.

## 2025-01-07 NOTE — ASSESSMENT & PLAN NOTE
Patient currently only needing her Xanax 1-2 times a week as her anxiety has markedly improved situationally.  Orders:  •  escitalopram (LEXAPRO) 10 mg tablet; Take 1 tablet (10 mg total) by mouth daily

## 2025-02-07 ENCOUNTER — TELEPHONE (OUTPATIENT)
Age: 60
End: 2025-02-07

## 2025-02-07 NOTE — TELEPHONE ENCOUNTER
Pt called and is requesting a lab script for urinalysis as she thinks she is starting with a UTI.  States she is going to the lab on Tuesday for other blood work and would like to do that too.    I did offer an appt to see if she does have a UTI.  She declined and would just like the script to check.

## 2025-02-10 DIAGNOSIS — R39.9 UTI SYMPTOMS: Primary | ICD-10-CM

## 2025-02-12 ENCOUNTER — APPOINTMENT (OUTPATIENT)
Dept: LAB | Age: 60
End: 2025-02-12
Payer: COMMERCIAL

## 2025-02-12 DIAGNOSIS — R39.9 UTI SYMPTOMS: ICD-10-CM

## 2025-02-12 LAB
25(OH)D3 SERPL-MCNC: 29 NG/ML (ref 30–100)
BASOPHILS # BLD AUTO: 0.05 THOUSANDS/ΜL (ref 0–0.1)
BASOPHILS NFR BLD AUTO: 1 % (ref 0–1)
EOSINOPHIL # BLD AUTO: 0.23 THOUSAND/ΜL (ref 0–0.61)
EOSINOPHIL NFR BLD AUTO: 5 % (ref 0–6)
ERYTHROCYTE [DISTWIDTH] IN BLOOD BY AUTOMATED COUNT: 14 % (ref 11.6–15.1)
HCT VFR BLD AUTO: 37.1 % (ref 34.8–46.1)
HGB BLD-MCNC: 11.6 G/DL (ref 11.5–15.4)
IMM GRANULOCYTES # BLD AUTO: 0 THOUSAND/UL (ref 0–0.2)
IMM GRANULOCYTES NFR BLD AUTO: 0 % (ref 0–2)
LYMPHOCYTES # BLD AUTO: 1.76 THOUSANDS/ΜL (ref 0.6–4.47)
LYMPHOCYTES NFR BLD AUTO: 41 % (ref 14–44)
MCH RBC QN AUTO: 29.3 PG (ref 26.8–34.3)
MCHC RBC AUTO-ENTMCNC: 31.3 G/DL (ref 31.4–37.4)
MCV RBC AUTO: 94 FL (ref 82–98)
MONOCYTES # BLD AUTO: 0.42 THOUSAND/ΜL (ref 0.17–1.22)
MONOCYTES NFR BLD AUTO: 10 % (ref 4–12)
NEUTROPHILS # BLD AUTO: 1.85 THOUSANDS/ΜL (ref 1.85–7.62)
NEUTS SEG NFR BLD AUTO: 43 % (ref 43–75)
NRBC BLD AUTO-RTO: 0 /100 WBCS
PLATELET # BLD AUTO: 257 THOUSANDS/UL (ref 149–390)
PMV BLD AUTO: 10.7 FL (ref 8.9–12.7)
RBC # BLD AUTO: 3.96 MILLION/UL (ref 3.81–5.12)
TSH SERPL DL<=0.05 MIU/L-ACNC: 3.24 UIU/ML (ref 0.45–4.5)
WBC # BLD AUTO: 4.31 THOUSAND/UL (ref 4.31–10.16)

## 2025-02-12 PROCEDURE — 87186 SC STD MICRODIL/AGAR DIL: CPT

## 2025-02-12 PROCEDURE — 87086 URINE CULTURE/COLONY COUNT: CPT

## 2025-02-12 PROCEDURE — 87077 CULTURE AEROBIC IDENTIFY: CPT

## 2025-02-13 LAB
ALBUMIN SERPL BCG-MCNC: 4 G/DL (ref 3.5–5)
ALP SERPL-CCNC: 50 U/L (ref 34–104)
ALT SERPL W P-5'-P-CCNC: 11 U/L (ref 7–52)
ANION GAP SERPL CALCULATED.3IONS-SCNC: 9 MMOL/L (ref 4–13)
AST SERPL W P-5'-P-CCNC: 18 U/L (ref 13–39)
BILIRUB SERPL-MCNC: 0.76 MG/DL (ref 0.2–1)
BUN SERPL-MCNC: 13 MG/DL (ref 5–25)
CALCIUM SERPL-MCNC: 9.1 MG/DL (ref 8.4–10.2)
CHLORIDE SERPL-SCNC: 100 MMOL/L (ref 96–108)
CHOLEST SERPL-MCNC: 240 MG/DL (ref ?–200)
CO2 SERPL-SCNC: 28 MMOL/L (ref 21–32)
CREAT SERPL-MCNC: 0.62 MG/DL (ref 0.6–1.3)
GFR SERPL CREATININE-BSD FRML MDRD: 97 ML/MIN/1.73SQ M
GLUCOSE P FAST SERPL-MCNC: 90 MG/DL (ref 65–99)
HDLC SERPL-MCNC: 79 MG/DL
LDLC SERPL CALC-MCNC: 150 MG/DL (ref 0–100)
NONHDLC SERPL-MCNC: 161 MG/DL
POTASSIUM SERPL-SCNC: 4.4 MMOL/L (ref 3.5–5.3)
PROT SERPL-MCNC: 6.8 G/DL (ref 6.4–8.4)
SODIUM SERPL-SCNC: 137 MMOL/L (ref 135–147)
TRIGL SERPL-MCNC: 54 MG/DL (ref ?–150)

## 2025-02-14 ENCOUNTER — RESULTS FOLLOW-UP (OUTPATIENT)
Dept: FAMILY MEDICINE CLINIC | Facility: CLINIC | Age: 60
End: 2025-02-14

## 2025-02-14 DIAGNOSIS — E78.2 MIXED HYPERLIPIDEMIA: ICD-10-CM

## 2025-02-14 DIAGNOSIS — N39.0 URINARY TRACT INFECTION WITHOUT HEMATURIA, SITE UNSPECIFIED: Primary | ICD-10-CM

## 2025-02-14 DIAGNOSIS — E55.9 VITAMIN D DEFICIENCY: Primary | ICD-10-CM

## 2025-02-14 LAB — BACTERIA UR CULT: ABNORMAL

## 2025-02-14 RX ORDER — NITROFURANTOIN 25; 75 MG/1; MG/1
100 CAPSULE ORAL 2 TIMES DAILY
Qty: 14 CAPSULE | Refills: 0 | Status: SHIPPED | OUTPATIENT
Start: 2025-02-14 | End: 2025-02-21

## 2025-02-14 NOTE — RESULT ENCOUNTER NOTE
The patient know the following regarding her blood work results:  1.  LDL is very elevated at 150 up from 113 which was previously 105 and 100 in the past.  Patient needs to cut back on fat and cholesterol intake and I would like to repeat this in 6 months.  If this number continues to remain elevated medication will be recommended.  #2.  Vitamin D slightly low at 29.  Patient is not already taking 1000 IU of vitamin D daily this is what I am recommending.  If she is already on the 1000 IU daily then I would increase by 1000.  3.  CBC CMP TSH all within normal limits.

## 2025-02-14 NOTE — RESULT ENCOUNTER NOTE
Please the patient know that she does have some bacteria on her urine culture and therefore I will call in Macrobid to take as directed to her local pharmacy.  Thank you.  Increase fluids.

## 2025-02-18 ENCOUNTER — HOSPITAL ENCOUNTER (OUTPATIENT)
Dept: RADIOLOGY | Age: 60
Discharge: HOME | End: 2025-02-18
Payer: COMMERCIAL

## 2025-02-18 DIAGNOSIS — E04.2 MULTIPLE THYROID NODULES: ICD-10-CM

## 2025-02-18 PROCEDURE — 76536 US EXAM OF HEAD AND NECK: CPT

## 2025-02-20 DIAGNOSIS — F41.9 ANXIETY: ICD-10-CM

## 2025-02-20 RX ORDER — ALPRAZOLAM 0.25 MG/1
0.25 TABLET ORAL
Qty: 20 TABLET | Refills: 0 | Status: SHIPPED | OUTPATIENT
Start: 2025-02-20

## 2025-02-24 ENCOUNTER — RESULTS FOLLOW-UP (OUTPATIENT)
Dept: FAMILY MEDICINE CLINIC | Facility: CLINIC | Age: 60
End: 2025-02-24

## 2025-02-28 ENCOUNTER — ANNUAL EXAM (OUTPATIENT)
Dept: GYNECOLOGY | Facility: CLINIC | Age: 60
End: 2025-02-28
Payer: COMMERCIAL

## 2025-02-28 VITALS — SYSTOLIC BLOOD PRESSURE: 108 MMHG | DIASTOLIC BLOOD PRESSURE: 62 MMHG

## 2025-02-28 DIAGNOSIS — Z12.31 ENCOUNTER FOR SCREENING MAMMOGRAM FOR BREAST CANCER: ICD-10-CM

## 2025-02-28 DIAGNOSIS — Z01.419 ENCOUNTER FOR ANNUAL ROUTINE GYNECOLOGICAL EXAMINATION: Primary | ICD-10-CM

## 2025-02-28 PROCEDURE — S0612 ANNUAL GYNECOLOGICAL EXAMINA: HCPCS | Performed by: OBSTETRICS & GYNECOLOGY

## 2025-02-28 NOTE — PROGRESS NOTES
Name: Anali Lagunas      : 1965      MRN: [id number]  Encounter Provider: Laila Zarate DO  Encounter Date: 2025   Encounter department: [facility]  :  Assessment & Plan  Encounter for annual routine gynecological examination         Encounter for screening mammogram for breast cancer    Orders:    Mammo screening bilateral w 3d and cad; Future    pap is up to date    mammogram reviewed with her including breast density.  RX given for   Discussed self breast exams    colon cancer screening -colonoscopy in 2023, recall in 5 years    Osteopenia-reviewed adequate calcium and vitamin D.  We discussed strength training, weightbearing exercise.  She has her DEXA scheduled.  This was ordered by her PCP.  Of note, her sister and brother both have osteoporosis and so did both of her parents.    discussed preventive care, regular exercise and a healthy diet      History of Present Illness   HPI  Anali Lagunas is a 59 y.o. female who presents for yearly.  She has no GYN complaints.  She uses coconut oil for vaginal dryness.  She has thyroid nodules which are followed by her family doctor.    Normal 3D mammogra last February with scattered fibroglandular densities and average risk.  She has this scheduled for next month.  Sister dx in her early 50s  Normal pap, negative HPV in   DEXA in  with osteoepenia at all sites, did not meet treatment criteria      Review of Systems   Constitutional: Negative.    Gastrointestinal: Negative.    Genitourinary: Negative.           Objective   There were no vitals taken for this visit.     Physical Exam  Vitals and nursing note reviewed. Exam conducted with a chaperone present.   Constitutional:       Appearance: She is well-developed.   HENT:      Head: Normocephalic and atraumatic.   Neck:      Thyroid: Thyromegaly present.   Cardiovascular:      Rate and Rhythm: Normal rate and regular rhythm.   Pulmonary:      Effort: Pulmonary  effort is normal.      Breath sounds: Normal breath sounds.   Chest:   Breasts:     Right: Normal.      Left: Normal.      Comments: Examined seated and supine  Abdominal:      Palpations: Abdomen is soft.   Genitourinary:     General: Normal vulva.      Vagina: Normal.      Cervix: Normal.      Uterus: Normal.       Adnexa: Right adnexa normal and left adnexa normal.      Rectum: Normal.   Musculoskeletal:      Cervical back: Neck supple.   Skin:     General: Skin is warm and dry.   Neurological:      Mental Status: She is alert.   Psychiatric:         Mood and Affect: Mood normal.

## 2025-03-17 ENCOUNTER — HOSPITAL ENCOUNTER (OUTPATIENT)
Dept: MAMMOGRAPHY | Facility: MEDICAL CENTER | Age: 60
Discharge: HOME | End: 2025-03-17
Payer: COMMERCIAL

## 2025-03-17 VITALS — HEIGHT: 63 IN | BODY MASS INDEX: 24.45 KG/M2 | WEIGHT: 138 LBS

## 2025-03-17 DIAGNOSIS — Z12.31 ENCOUNTER FOR SCREENING MAMMOGRAM FOR BREAST CANCER: ICD-10-CM

## 2025-03-17 PROCEDURE — 77067 SCR MAMMO BI INCL CAD: CPT

## 2025-03-17 PROCEDURE — 77063 BREAST TOMOSYNTHESIS BI: CPT

## 2025-03-19 ENCOUNTER — RESULTS FOLLOW-UP (OUTPATIENT)
Dept: GYNECOLOGY | Facility: CLINIC | Age: 60
End: 2025-03-19

## 2025-03-19 DIAGNOSIS — Z12.39 SCREENING FOR BREAST CANCER USING NON-MAMMOGRAM MODALITY: ICD-10-CM

## 2025-03-19 DIAGNOSIS — R92.30 DENSE BREAST TISSUE ON MAMMOGRAM, UNSPECIFIED TYPE: Primary | ICD-10-CM

## 2025-05-29 DIAGNOSIS — F41.9 ANXIETY: ICD-10-CM

## 2025-05-29 NOTE — TELEPHONE ENCOUNTER
Reason for call:   [x] Refill   [] Prior Auth  [] Other:     Office:   [x] PCP/Provider - Sukhwinder  [] Specialty/Provider -     Medication:   Alprazolam 0.25 mg, 1 qd prn, 20    Pharmacy:   [facility]   Does the patient have enough for 3 days?   [x] Yes   [] No - Send as HP to POD    [facility]   Does the patient have enough for 10 days?   [] Yes   [] No - Send as HP to POD

## 2025-05-30 RX ORDER — ALPRAZOLAM 0.25 MG
0.25 TABLET ORAL
Qty: 20 TABLET | Refills: 0 | Status: SHIPPED | OUTPATIENT
Start: 2025-05-30

## 2025-07-29 DIAGNOSIS — F41.9 ANXIETY: ICD-10-CM

## 2025-07-30 RX ORDER — ALPRAZOLAM 0.25 MG
0.25 TABLET ORAL
Qty: 20 TABLET | Refills: 0 | Status: SHIPPED | OUTPATIENT
Start: 2025-07-30

## (undated) DEVICE — IV EXTENSION TUBING 33 IN

## (undated) DEVICE — SCD SEQUENTIAL COMPRESSION COMFORT SLEEVE MEDIUM KNEE LENGTH: Brand: KENDALL SCD

## (undated) DEVICE — PREMIUM DRY TRAY LF: Brand: MEDLINE INDUSTRIES, INC.

## (undated) DEVICE — GLOVE SRG BIOGEL 6.5

## (undated) DEVICE — CYSTO TUBING SINGLE IRRIGATION

## (undated) DEVICE — GLOVE SRG BIOGEL 7.5

## (undated) DEVICE — GLOVE INDICATOR PI UNDERGLOVE SZ 7 BLUE

## (undated) DEVICE — STRL ALLENTOWN HYSTEROSCOPY PK: Brand: CARDINAL HEALTH

## (undated) DEVICE — 2000CC GUARDIAN II: Brand: GUARDIAN

## (undated) DEVICE — STRAIGHT CATH RED RUBBER 12FR